# Patient Record
Sex: FEMALE | Race: WHITE | NOT HISPANIC OR LATINO | Employment: FULL TIME | URBAN - NONMETROPOLITAN AREA
[De-identification: names, ages, dates, MRNs, and addresses within clinical notes are randomized per-mention and may not be internally consistent; named-entity substitution may affect disease eponyms.]

---

## 2018-04-16 ENCOUNTER — TRANSCRIBE ORDERS (OUTPATIENT)
Dept: PHYSICAL THERAPY | Facility: CLINIC | Age: 43
End: 2018-04-16

## 2018-04-16 DIAGNOSIS — M22.2X9 DISORDER OF PATELLOFEMORAL JOINT, UNSPECIFIED LATERALITY: Primary | ICD-10-CM

## 2018-04-26 ENCOUNTER — HOSPITAL ENCOUNTER (OUTPATIENT)
Dept: PHYSICAL THERAPY | Facility: HOSPITAL | Age: 43
Setting detail: THERAPIES SERIES
Discharge: HOME | End: 2018-04-26

## 2018-04-26 DIAGNOSIS — M22.2X9 DISORDER OF PATELLOFEMORAL JOINT, UNSPECIFIED LATERALITY: Primary | ICD-10-CM

## 2018-04-26 PROCEDURE — 97161 PT EVAL LOW COMPLEX 20 MIN: CPT | Performed by: PHYSICAL THERAPIST

## 2018-04-26 PROCEDURE — 97110 THERAPEUTIC EXERCISES: CPT | Performed by: PHYSICAL THERAPIST

## 2018-04-26 NOTE — THERAPY EVALUATION
Outpatient Physical Therapy Ortho Initial Evaluation  [facility]     Patient Name: Lyla DOBBS  : 1975  MRN: [id number]  Today's Date: 2018      Visit Date: 2018    There is no problem list on file for this patient.       No past medical history on file.     No past surgical history on file.    Visit Dx:     ICD-10-CM ICD-9-CM   1. Disorder of patellofemoral joint, unspecified laterality M22.2X9 719.96             Patient History     Row Name 18 0900             History    Chief Complaint Pain  -FESTUS      Type of Pain Knee pain  -FESTUS      Date Current Problem(s) Began 18   few months  -FESTUS      Brief Description of Current Complaint Patient notes slow and insidious onset of progressive right knee pain with difficulty with stair ascension>descension.  She notes mild swelling, mostly anterior pain.  Pain is at rest as well as with stairs.  She has had mild releif with dicolfenac sodium.    -FESTUS      Patient/Caregiver Goals Relieve pain  -FESTUS      Current Tobacco Use nonuser  -FESTUS      Occupation/sports/leisure activities .  Hobbies: was trying to exercise, walking, kids:14-18 yo.    -FESTUS      How has patient tried to help current problem? meds  -FESTUS         Pain     Pain Location Knee  -FESTUS      Pain at Present 1  -FESTUS      Pain at Best 1  -FESTUS      Pain at Worst 8;9  -FESTUS      Pain Description Stabbing;Pounding  -FESTUS      What Performance Factors Make the Current Problem(s) WORSE? stairs  -FESTUS      What Performance Factors Make the Current Problem(s) BETTER? rest, meds  -FESTUS      Difficulties at work? none reported  -FESTUS      Difficulties with ADL's? socks/shoes  -FESTUS      Difficulties with recreational activities? exercise, walking  -FESTUS         Fall Risk Assessment    Any falls in the past year: No  -FESTUS         Daily Activities    Primary Language English  -FESTUS      Barriers to learning Visual  -FESTUS      Pt Participated in POC and Goals No  -FESTUS         Safety    Are you being  hurt, hit, or frightened by anyone at home or in your life? Yes  -FESTUS        User Key  (r) = Recorded By, (t) = Taken By, (c) = Cosigned By    Initials Name Provider Type    FESTUS Bell PT Physical Therapist                PT Ortho     Row Name 04/26/18 1000       Posture/Observations    Posture/Observations Comments overweight female with mid genu varus noted.  Minimal to no swelling is noted.  -FESTUS       Special Tests/Palpation    Special Tests/Palpation Knee  -FESTUS       Knee Palpation    Patella Tendon Bilateral:;Tender   R>L  -FESTUS    Pes Anserine Bursa Bilateral:;Tender  -FESTUS    Medial Joint Line --   nontender  -FESTUS    Lateral Joint Line --   nontender  -FESTUS    Posterior Joint Line Bilateral:;Tender  -FESTUS    Medial Gastroc Head Bilateral:;Guarded/taut  -FESTUS    Lateral Gastroc Head Bilateral:;Guarded/taut  -FESTUS    Knee Palpation? Yes  -FESTUS       Knee Special Tests    Anterior drawer (ACL lesion) Negative  -FESTUS    Lachman’s (ACL lesion) Negative  -FESTUS    Posterior drawer (PCL lesion) Right:;Positive   mildly more laxed  -FESTUS    Valgus stress (MCL lesion) Negative  -FESTUS    Varus stress (LCL lesion) Negative  -FESTUS    Juanita’s test (meniscal lesion) Negative  -FESTUS    Patellar grind test (chondromalacia patella) Right:;Positive  -FESTUS       General ROM    RT Lower Ext Rt Knee Extension/Flexion  -FESTUS    LT Lower Ext Lt Knee Extension/Flexion  -FESTUS       Right Lower Ext    Rt Knee Extension/Flexion AROM 3-0-115  -FESTUS       Left Lower Ext    Lt Knee Extension/Flexion AROM 3-0-120  -FESTUS       MMT (Manual Muscle Testing)    Additional Documentation General Assessment (Manual Muscle Testing) (Group)  -FESTUS       General Assessment (Manual Muscle Testing)    General Manual Muscle Testing (MMT) Assessment lower extremity strength deficits identified  -FETSUS       Lower Extremity (Manual Muscle Testing)    Lower Extremity: Manual Muscle Testing (MMT) left hip strength deficit;right hip strength deficit  -FESTUS    Comment, MMT: Lower Extremity pain  with resisted right quad  -FESTUS       Left Hip (Manual Muscle Testing)    Left Hip Manual Muscle Testing (MMT) extension;abduction  -FESTUS    MMT: Extension, Left Hip additional muscles  -FESTUS    MMT: Extension, Left Hip Muscles gluteus valdemar  -FESTUS    MMT, Gluteus Valdemar: Left Hip Extension (3+/5) fair plus  -FESTUS    MMT: ABduction, Left Hip additional muscles  -FESTUS    MMT: ABduction, Left Hip Muscles gluteus medius  -FESTUS    MMT, Gluteus Medius: Left Hip ABduction (3/5) fair  -FESTUS       Right Hip (Manual Muscle Testing)    MMT, Right Hip: Manual Muscle Testing (MMT) symetrical weakness noted at the hips  -FESTUS       Flexibility    Flexibility Tested? Lower Extremity  -FESTUS       Lower Extremity Flexibility    Hamstrings Bilateral:;Moderately limited  -FESTUS    Quadriceps Bilateral:;Moderately limited  -FESTUS    Hip External Rotators Bilateral:;Moderately limited  -FESTUS    Hip Internal Rotators Bilateral:;Mildly limited;Moderately limited  -FESTUS       Gait/Stairs Assessment/Training    Comment (Gait/Stairs) intermitted RLE antalgia is noted with fatigue.  Minimal antalgia with stairs is noted without hand rail but she notes pain with up>down.  -FESTUS      User Key  (r) = Recorded By, (t) = Taken By, (c) = Cosigned By    Initials Name Provider Type    FESTUS Bell, PT Physical Therapist                      Therapy Education  Education Details: initiated HEP per exercise flowsheet  Given: HEP  Program: New  How Provided: Verbal, Demonstration, Written  Provided to: Patient  Level of Understanding: Verbalized, Demonstrated           PT OP Goals     Row Name 04/26/18 1000          PT Short Term Goals    STG Date to Achieve 05/10/18  -FESTUS     STG 1 Patient to be compliant with initial HEP  -FESTUS     STG 1 Progress New  -FESTUS     STG 2 Patient to report pain intermittently 0/10  -FESTUS     STG 2 Progress New  -FESTUS        Long Term Goals    LTG Date to Achieve 05/24/18  -FESTUS     LTG 1 Patient to be independent with final HEP  -FESTUS     LTG 1 Progress New   -FESTUS     LTG 2 Patient to demonstrate squatting to at least 75 knee flexion with minimal to no pain  -FESTUS     LTG 2 Progress New  -FESTUS     LTG 3 Patient to demonstrate minimal palpable tenderness  -FESTUS     LTG 3 Progress New  -FESTUS     LTG 4 Patient to improve LEFS score to at least 53/80  -FESTUS     LTG 4 Progress New  -FESTUS        Time Calculation    PT Goal Re-Cert Due Date 07/25/18  -FESTUS       User Key  (r) = Recorded By, (t) = Taken By, (c) = Cosigned By    Initials Name Provider Type    FESTUS Abhay Bell, PT Physical Therapist                PT Assessment/Plan     Row Name 04/26/18 1000          PT Assessment    Functional Limitations Impaired gait;Impaired locomotion;Performance in self-care ADL;Performance in leisure activities  -FESTUS     Impairments Gait;Endurance;Poor body mechanics;Pain;Range of motion  -FESTUS     Assessment Comments Patient presents with anterior knee pain at least 2-3 months in duration with painful stairs ascension and pain with bending the knee.  She demonstrate mild to moderate hip weakness with HS and calf tightness and pain with resisted quads.    -FESTUS     Please refer to paper survey for additional self-reported information Yes  -FESTUS     Rehab Potential Good  -FESTUS     Patient/caregiver participated in establishment of treatment plan and goals Yes  -FESTUS     Patient would benefit from skilled therapy intervention Yes  -FESTUS        PT Plan    PT Frequency 2x/week;1x/week  -FESTUS     Predicted Duration of Therapy Intervention (OT Eval) 6-8 visits  -FESTUS     Planned CPT's? PT EVAL LOW COMPLEXITY: 34624;PT THER PROC EA 15 MIN: 21460;PT MANUAL THERAPY EA 15 MIN: 73547;PT NEUROMUSC RE-EDUCATION EA 15 MIN: 32673;PT ELECTRICAL STIM UNATTEND: ;PT ULTRASOUND EA 15 MIN: 35621;PT HOT/COLD PACK WC NONMCARE: 31037  -FESTUS     PT Plan Comments PFPS appropriate strengthening and stretching, squat training, modalities and manual techniques as indicated.  -FESTUS       User Key  (r) = Recorded By, (t) = Taken By, (c) = Cosigned  By    Initials Name Provider Type    FESTUS Bell, PT Physical Therapist                  Exercises     Row Name 04/26/18 1000             Exercise 1    Exercise Name 1 Total Ther ex time  -FESTUS      Time 1 10 minutes  -FESTUS         Exercise 2    Exercise Name 2 S/L hip abd  -FESTUS      Reps 2 10 each  -FESTUS         Exercise 3    Exercise Name 3 prone quad stretch  -FSETUS      Sets 3 2  -FESTUS      Time 3 30 seconds  -FESTUS         Exercise 4    Exercise Name 4 seated HS stretch  -FESTUS      Reps 4 2  -FESTUS      Time 4 30 seconds  -FESTUS        User Key  (r) = Recorded By, (t) = Taken By, (c) = Cosigned By    Initials Name Provider Type    FESTUS Bell, PT Physical Therapist                                  Time Calculation:   Start Time: 0943  Total Timed Code Minutes- PT: 10 minute(s)     Therapy Charges for Today     Code Description Service Date Service Provider Modifiers Qty    29620544284 HC PT THER PROC EA 15 MIN 4/26/2018 Abhay Bell, PT GP 1    70104214578 HC PT EVAL LOW COMPLEXITY 3 4/26/2018 Abhay Bell, PT GP 1                    Abhay Bell, PT  4/26/2018

## 2018-04-30 ENCOUNTER — HOSPITAL ENCOUNTER (OUTPATIENT)
Dept: PHYSICAL THERAPY | Facility: HOSPITAL | Age: 43
Setting detail: THERAPIES SERIES
Discharge: HOME | End: 2018-04-30

## 2018-04-30 DIAGNOSIS — M22.2X9 DISORDER OF PATELLOFEMORAL JOINT, UNSPECIFIED LATERALITY: Primary | ICD-10-CM

## 2018-04-30 PROCEDURE — 97110 THERAPEUTIC EXERCISES: CPT | Performed by: PHYSICAL THERAPIST

## 2018-05-03 ENCOUNTER — HOSPITAL ENCOUNTER (OUTPATIENT)
Dept: PHYSICAL THERAPY | Facility: HOSPITAL | Age: 43
Setting detail: THERAPIES SERIES
Discharge: HOME | End: 2018-05-03

## 2018-05-03 DIAGNOSIS — M22.2X9 DISORDER OF PATELLOFEMORAL JOINT, UNSPECIFIED LATERALITY: Primary | ICD-10-CM

## 2018-05-03 PROCEDURE — 97110 THERAPEUTIC EXERCISES: CPT | Performed by: PHYSICAL THERAPIST

## 2018-05-03 NOTE — THERAPY TREATMENT NOTE
Outpatient Physical Therapy Ortho Treatment Note  [facility]     Patient Name: Lyla DOBBS  : 1975  MRN: [id number]  Today's Date: 5/3/2018      Visit Date: 2018    Visit Dx:    ICD-10-CM ICD-9-CM   1. Disorder of patellofemoral joint, unspecified laterality M22.2X9 719.96       There is no problem list on file for this patient.       No past medical history on file.     No past surgical history on file.                          PT Assessment/Plan     Row Name 18 0800          PT Assessment    Assessment Comments Patient demonstrates improved ROM and tolerance to activities but it is difficult to assess the benefit of taping d/t duration after last session.  -FESTUS        PT Plan    PT Plan Comments add sidestepping/zig-zags and potential OKC LAQ 90-45  -FESTUS       User Key  (r) = Recorded By, (t) = Taken By, (c) = Cosigned By    Initials Name Provider Type    FESTUS Bell, PT Physical Therapist                Modalities     Row Name 18 07             Other Treatment Provided    Taping / Bracing right McConnel patellar taping with medial tibial torsion  -FESTUS        User Key  (r) = Recorded By, (t) = Taken By, (c) = Cosigned By    Initials Name Provider Type    FESTUS Bell, PT Physical Therapist                Exercises     Row Name 18 07             Subjective Comments    Subjective Comments Patient states that the tape was coming off by the next morning and she is unsure whether it helped or not.  Her pain has remained minimal, but she has continued to take her medicine  -FESTUS         Subjective Pain    Able to rate subjective pain? yes  -FESTUS      Pre-Treatment Pain Level 1  -FESTUS         Exercise 1    Exercise Name 1 Total Ther ex time  -FESTUS      Time 1 30  -FESTUS         Exercise 2    Exercise Name 2 NuStep L5  -FESTUS      Time 2 4 minutes  -FESTUS         Exercise 3    Exercise Name 3 prone quad stretch  -FESTUS      Sets 3 2  -FESTUS      Time 3 30 seconds  -FESTUS         Exercise 4     Exercise Name 4 seated HS stretch  -FESTUS      Reps 4 2  -FESTUS      Time 4 30 seconds  -FESTUS         Exercise 5    Exercise Name 5 incline stretch  -FESTUS      Sets 5 2  -FESTUS      Time 5 1 minutes  -FESTUS         Exercise 7    Exercise Name 7 squat training  -FESTUS      Time 7 10 minutes  -FESTUS        User Key  (r) = Recorded By, (t) = Taken By, (c) = Cosigned By    Initials Name Provider Type    FESTUS Abhay Bell, PT Physical Therapist                               Time Calculation:   Start Time: 0730  Total Timed Code Minutes- PT: 31 minute(s)    Therapy Charges for Today     Code Description Service Date Service Provider Modifiers Qty    07203038575  PT THER PROC EA 15 MIN 5/3/2018 Abhay Bell, PT GP 2                    Abhay Bell, PT  5/3/2018

## 2018-05-07 ENCOUNTER — APPOINTMENT (OUTPATIENT)
Dept: PHYSICAL THERAPY | Facility: HOSPITAL | Age: 43
End: 2018-05-07

## 2018-05-10 ENCOUNTER — APPOINTMENT (OUTPATIENT)
Dept: PHYSICAL THERAPY | Facility: HOSPITAL | Age: 43
End: 2018-05-10

## 2018-05-14 ENCOUNTER — APPOINTMENT (OUTPATIENT)
Dept: PHYSICAL THERAPY | Facility: HOSPITAL | Age: 43
End: 2018-05-14

## 2018-05-17 ENCOUNTER — APPOINTMENT (OUTPATIENT)
Dept: PHYSICAL THERAPY | Facility: HOSPITAL | Age: 43
End: 2018-05-17

## 2018-05-21 ENCOUNTER — APPOINTMENT (OUTPATIENT)
Dept: PHYSICAL THERAPY | Facility: HOSPITAL | Age: 43
End: 2018-05-21

## 2018-05-29 ENCOUNTER — APPOINTMENT (OUTPATIENT)
Dept: PHYSICAL THERAPY | Facility: HOSPITAL | Age: 43
End: 2018-05-29

## 2018-05-31 ENCOUNTER — APPOINTMENT (OUTPATIENT)
Dept: PHYSICAL THERAPY | Facility: HOSPITAL | Age: 43
End: 2018-05-31

## 2018-05-31 ENCOUNTER — DOCUMENTATION (OUTPATIENT)
Dept: PHYSICAL THERAPY | Facility: HOSPITAL | Age: 43
End: 2018-05-31

## 2018-05-31 DIAGNOSIS — M22.2X9 DISORDER OF PATELLOFEMORAL JOINT, UNSPECIFIED LATERALITY: Primary | ICD-10-CM

## 2018-05-31 NOTE — THERAPY DISCHARGE NOTE
Outpatient Physical Therapy Discharge Summary         Patient Name: Lyla DOBBS  : 1975  MRN: [id number]    Today's Date: 2018    Visit Dx:    ICD-10-CM ICD-9-CM   1. Disorder of patellofemoral joint, unspecified laterality M22.2X9 719.96             PT OP Goals     Row Name 18 1300          PT Short Term Goals    STG Date to Achieve 05/10/18  -FESTUS     STG 1 Patient to be compliant with initial HEP  -FESTUS     STG 1 Progress Met  -FESTUS     STG 2 Patient to report pain intermittently 0/10  -FESTUS     STG 2 Progress Met  -FESTUS        Long Term Goals    LTG Date to Achieve 18  -FESTUS     LTG 1 Patient to be independent with final HEP  -FESTUS     LTG 1 Progress Partially Met  -FESTUS     LTG 2 Patient to demonstrate squatting to at least 75 knee flexion with minimal to no pain  -FESTUS     LTG 2 Progress Not Met  -FESTUS     LTG 3 Patient to demonstrate minimal palpable tenderness  -FESTUS     LTG 3 Progress Not Met  -FESTUS     LTG 4 Patient to improve LEFS score to at least 53/80  -FESTUS     LTG 4 Progress Not Met  -FESTUS       User Key  (r) = Recorded By, (t) = Taken By, (c) = Cosigned By    Initials Name Provider Type    FESTUS Bell, PT Physical Therapist          OP PT Discharge Summary  Date of Discharge: 18  Reason for Discharge: Patient/Caregiver request  Outcomes Achieved: Patient able to partially acheive established goals, Refer to plan of care for updates on goals achieved  Discharge Destination: Home with home program  Discharge Instructions/Additional Comments: Patient requested to hold PT due to personal circumstances      3/4 visits attended      Abhay Bell, PT  2018

## 2021-01-12 ENCOUNTER — OFFICE VISIT (OUTPATIENT)
Dept: ORTHOPEDIC SURGERY | Facility: CLINIC | Age: 46
End: 2021-01-12

## 2021-01-12 VITALS — HEIGHT: 67 IN | WEIGHT: 231 LBS | RESPIRATION RATE: 18 BRPM | BODY MASS INDEX: 36.26 KG/M2

## 2021-01-12 DIAGNOSIS — M77.11 LATERAL EPICONDYLITIS OF RIGHT ELBOW: ICD-10-CM

## 2021-01-12 DIAGNOSIS — G56.03 BILATERAL CARPAL TUNNEL SYNDROME: ICD-10-CM

## 2021-01-12 DIAGNOSIS — M25.521 ARTHRALGIA OF RIGHT ELBOW: Primary | ICD-10-CM

## 2021-01-12 PROCEDURE — 99203 OFFICE O/P NEW LOW 30 MIN: CPT | Performed by: ORTHOPAEDIC SURGERY

## 2021-01-12 PROCEDURE — 20550 NJX 1 TENDON SHEATH/LIGAMENT: CPT | Performed by: ORTHOPAEDIC SURGERY

## 2021-01-12 RX ORDER — LORATADINE 10 MG/1
TABLET ORAL DAILY
COMMUNITY
Start: 2020-12-05 | End: 2021-07-13

## 2021-01-12 RX ORDER — LIDOCAINE HYDROCHLORIDE 10 MG/ML
5 INJECTION, SOLUTION INFILTRATION; PERINEURAL
Status: COMPLETED | OUTPATIENT
Start: 2021-01-12 | End: 2021-01-12

## 2021-01-12 RX ORDER — LEVOTHYROXINE, LIOTHYRONINE 57; 13.5 UG/1; UG/1
90 TABLET ORAL DAILY
COMMUNITY
Start: 2021-01-10

## 2021-01-12 RX ORDER — FOLIC ACID 1 MG/1
1000 TABLET ORAL DAILY
COMMUNITY
Start: 2020-11-06 | End: 2021-07-13

## 2021-01-12 RX ADMIN — LIDOCAINE HYDROCHLORIDE 5 MG: 10 INJECTION, SOLUTION INFILTRATION; PERINEURAL at 15:21

## 2021-01-12 NOTE — PROGRESS NOTES
Subjective   Patient ID: Lyla Castaneda is a 45 y.o. female  Pain of the Right Elbow (Patient states 2 months ago she woke up with pain in her elbow.)             History of Present Illness  Right-hand-dominant 45-year-old has had many years of numbness and tingling in both hands right worse than left she used to do soldering work for many years, now works in HR doing mostly computer work.  Complains of loss of feeling in a long and ring fingers of both hands has not improved with splinting has been treated for tennis elbow in the past by her PCP with bracing medications neither of which have really helped.  She denies trauma to the elbow loss of motion the elbow, elbow pain occurs laterally radiates down to the proximal radial forearm region at times.  Denies neck pain shoulder paresthesias or history of injection to the elbow.      Review of Systems   Constitutional: Negative for fever.   HENT: Negative for dental problem and voice change.    Eyes: Negative for visual disturbance.   Respiratory: Negative for shortness of breath.    Cardiovascular: Negative for chest pain.   Gastrointestinal: Negative for abdominal pain.   Genitourinary: Negative for dysuria.   Musculoskeletal: Positive for arthralgias. Negative for gait problem and joint swelling.   Skin: Negative for rash.   Neurological: Negative for speech difficulty.   Hematological: Does not bruise/bleed easily.   Psychiatric/Behavioral: Negative for confusion.       History reviewed. No pertinent past medical history.     History reviewed. No pertinent surgical history.    History reviewed. No pertinent family history.    Social History     Socioeconomic History   • Marital status:      Spouse name: Not on file   • Number of children: Not on file   • Years of education: Not on file   • Highest education level: Not on file   Tobacco Use   • Smoking status: Never Smoker   • Smokeless tobacco: Never Used   Substance and Sexual Activity   • Alcohol use:  "Never     Frequency: Never   • Drug use: Never   • Sexual activity: Defer       I have reviewed the medical and surgical history, family history, social history, medications, and/or allergies, and the review of systems of this report.    Allergies   Allergen Reactions   • Hydrocodone Nausea Only     Itching     • Morphine Itching         Current Outpatient Medications:   •  vitamin D3 125 MCG (5000 UT) capsule capsule, Take 5,000 Units by mouth Daily., Disp: , Rfl:   •  folic acid (FOLVITE) 1 MG tablet, Take 1,000 mcg by mouth Daily., Disp: , Rfl:   •  loratadine (CLARITIN) 10 MG tablet, Take  by mouth Daily., Disp: , Rfl:   •  NP Thyroid 90 MG tablet, Take 90 mg by mouth Daily., Disp: , Rfl:     Objective   Resp 18   Ht 170.2 cm (67\")   Wt 105 kg (231 lb)   BMI 36.18 kg/m²    Physical Exam  Constitutional: Patient is oriented to person, place, and time. Patient appears well-developed and well-nourished.   HENT:Head: Normocephalic and atraumatic.   Eyes: EOM are normal. Pupils are equal, round, and reactive to light.   Neck: Normal range of motion. Neck supple.   Cardiovascular: Normal rate.    Pulmonary/Chest: Effort normal and breath sounds normal.   Abdominal: Soft.   Neurological: Patient is alert and oriented to person, place, and time.   Skin: Skin is warm and dry.   Psychiatric: Patient has a normal mood and affect.   Nursing note and vitals reviewed.       [unfilled]   Right elbow: Point tenderness over the lateral epicondylar area minimal pain over the radiocapitellar region full supination pronation no elbow instability no tenderness olecranon or medial side of the elbow.  No proximal forearm atrophy or skin lesions noted.    Right hand: Positive Tinel's Phalen's and carpal compression test with reproduction of paresthesias in the long and ring finger, decreased sensation in those fingertips, good circulation full range of motion no palpable triggering of the fingers.  She does have a small palpable " "ganglion cyst on the dorsum of the right proximal radiocarpal area with minimal tenderness no signs of carpal instability.  No thenar atrophy    Left hand: Positive Tinel's Phalen's and carpal compression test with reproduction of paresthesias in the long and ring finger, no triggering of the fingers full painless wrist range of motion with no palpable ganglion cysts.  No thenar atrophy    Assessment/Plan   Review of Radiographic Studies:    Radiographic images today of affected area I personally viewed and showed no sign of acute fracture or dislocation.      Rt tennis elbow injection    Date/Time: 1/12/2021 3:21 PM  Performed by: Con Jose MD  Authorized by: Con Jose MD   Risks and benefits: risks, benefits and alternatives were discussed  Consent given by: patient  Patient understanding: patient states understanding of the procedure being performed  Patient consent: the patient's understanding of the procedure matches consent given  Procedure consent: procedure consent matches procedure scheduled  Relevant documents: relevant documents present and verified  Test results: test results available and properly labeled  Site marked: the operative site was marked  Imaging studies: imaging studies available  Patient identity confirmed: verbally with patient  Time out: Immediately prior to procedure a \"time out\" was called to verify the correct patient, procedure, equipment, support staff and site/side marked as required.  Preparation: Patient was prepped and draped in the usual sterile fashion.  Patient tolerance: patient tolerated the procedure well with no immediate complications  Comments: 0.5cc triamcinolone 40mg per 1mL [id number] lot#[id number] exp-10/01/2021  Medications administered: 5 mg lidocaine 1 %           Diagnoses and all orders for this visit:    1. Arthralgia of right elbow (Primary)  -     XR Elbow 3+ View Right    2. Lateral epicondylitis of right elbow    3. Bilateral carpal tunnel " syndrome       Orthopedic activities reviewed and patient expressed appreciation, Risk, benefits, and merits of treatment alternatives reviewed with the patient and questions answered, Using illustrations and models, the nature of the pathology was explained to the patient and Physical therapy referral given      Recommendations/Plan:   Work/Activity Status: May perform usual activities as tolerated    Patient agreeable to call or return sooner for any concerns.             Impression:  Bilateral hand carpal tunnel syndrome, right elbow lateral condyle-itis  Plan:  Refer to hand therapy for the elbow, EMG study both hands, discuss surgical treatment of both hands next visit

## 2021-01-20 ENCOUNTER — PROCEDURE VISIT (OUTPATIENT)
Dept: ORTHOPEDIC SURGERY | Facility: CLINIC | Age: 46
End: 2021-01-20

## 2021-01-20 DIAGNOSIS — R20.2 PARESTHESIA: ICD-10-CM

## 2021-01-20 DIAGNOSIS — G56.03 CARPAL TUNNEL SYNDROME, BILATERAL: ICD-10-CM

## 2021-01-20 PROCEDURE — 95886 MUSC TEST DONE W/N TEST COMP: CPT | Performed by: PHYSICAL THERAPIST

## 2021-01-20 PROCEDURE — 95911 NRV CNDJ TEST 9-10 STUDIES: CPT | Performed by: PHYSICAL THERAPIST

## 2021-02-09 ENCOUNTER — OFFICE VISIT (OUTPATIENT)
Dept: ORTHOPEDIC SURGERY | Facility: CLINIC | Age: 46
End: 2021-02-09

## 2021-02-09 VITALS — BODY MASS INDEX: 35.41 KG/M2 | WEIGHT: 225.6 LBS | HEIGHT: 67 IN | TEMPERATURE: 97.2 F

## 2021-02-09 DIAGNOSIS — G56.03 CARPAL TUNNEL SYNDROME, BILATERAL: Primary | ICD-10-CM

## 2021-02-09 DIAGNOSIS — G54.2 CERVICAL NEUROPATHY: ICD-10-CM

## 2021-02-09 PROCEDURE — 99213 OFFICE O/P EST LOW 20 MIN: CPT | Performed by: ORTHOPAEDIC SURGERY

## 2021-02-09 RX ORDER — EPINASTINE HCL 0.05 %
1 DROPS OPHTHALMIC (EYE) 2 TIMES DAILY
COMMUNITY
Start: 2021-01-18 | End: 2022-01-06

## 2021-02-09 NOTE — PROGRESS NOTES
Subjective   Patient ID: Lyla Castaneda is a 45 y.o. female  Results of the Left Wrist and Results of the Right Wrist (Go over EMG results)             History of Present Illness  Continued bilateral hand paresthesias recent EMG study entirely normal, her therapist thinks she may have an underlying cervical condition as she has increasing neck pain stiffness chronic headaches and bilateral posterior shoulder pain as well.  Night splinting has not helped either hand she said no progressive weakness injury or loss of motion either wrist.      Review of Systems   Constitutional: Negative for fever.   HENT: Negative for dental problem and voice change.    Eyes: Negative for visual disturbance.   Respiratory: Negative for shortness of breath.    Cardiovascular: Negative for chest pain.   Gastrointestinal: Negative for abdominal pain.   Genitourinary: Negative for dysuria.   Musculoskeletal: Positive for arthralgias. Negative for gait problem and joint swelling.   Skin: Negative for rash.   Neurological: Negative for speech difficulty.   Hematological: Does not bruise/bleed easily.   Psychiatric/Behavioral: Negative for confusion.       No past medical history on file.     No past surgical history on file.    No family history on file.    Social History     Socioeconomic History   • Marital status:      Spouse name: Not on file   • Number of children: Not on file   • Years of education: Not on file   • Highest education level: Not on file   Tobacco Use   • Smoking status: Never Smoker   • Smokeless tobacco: Never Used   Substance and Sexual Activity   • Alcohol use: Never     Frequency: Never   • Drug use: Never   • Sexual activity: Defer       I have reviewed the medical and surgical history, family history, social history, medications, and/or allergies, and the review of systems of this report.    Allergies   Allergen Reactions   • Hydrocodone Nausea Only     Itching     • Morphine Itching         Current  "Outpatient Medications:   •  Epinastine HCl 0.05 % ophthalmic solution, Administer 1 drop to both eyes 2 (Two) Times a Day., Disp: , Rfl:   •  folic acid (FOLVITE) 1 MG tablet, Take 1,000 mcg by mouth Daily., Disp: , Rfl:   •  loratadine (CLARITIN) 10 MG tablet, Take  by mouth Daily., Disp: , Rfl:   •  NP Thyroid 90 MG tablet, Take 90 mg by mouth Daily., Disp: , Rfl:   •  vitamin D3 125 MCG (5000 UT) capsule capsule, Take 5,000 Units by mouth Daily., Disp: , Rfl:     Objective   Temp 97.2 °F (36.2 °C)   Ht 170.2 cm (67\")   Wt 102 kg (225 lb 9.6 oz)   BMI 35.33 kg/m²    Physical Exam  Constitutional: Patient is oriented to person, place, and time. Patient appears well-developed and well-nourished.   HENT:Head: Normocephalic and atraumatic.   Eyes: EOM are normal. Pupils are equal, round, and reactive to light.   Neck: Normal range of motion. Neck supple.   Cardiovascular: Normal rate.    Pulmonary/Chest: Effort normal and breath sounds normal.   Abdominal: Soft.   Neurological: Patient is alert and oriented to person, place, and time.   Skin: Skin is warm and dry.   Psychiatric: Patient has a normal mood and affect.   Nursing note and vitals reviewed.       [unfilled]   Cervical spine: Limited range of motion with paracervical spasm with extension rotation, negative Spurling finding for posterior shoulder pain.    Right and left hands demonstrate decreased sensation long fingertips but no thenar atrophy full painless wrist range of motion and normal circulation    Assessment/Plan   Review of Radiographic Studies:    No new imaging done today.      Procedures     Diagnoses and all orders for this visit:    1. Carpal tunnel syndrome, bilateral (Primary)    2. Cervical neuropathy  -     Ambulatory Referral to Neurology       Orthopedic activities reviewed and patient expressed appreciation and Using illustrations and models, the nature of the pathology was explained to the patient      Recommendations/Plan: "   Work/Activity Status: May perform usual activities as tolerated    Patient agreeable to call or return sooner for any concerns.             Impression:  Bilateral hand paresthesias with normal EMG studies rule out cervical stenosis and/or cervical radiculopathy  Plan:  Neurology referral for further evaluation work-up treatment and she will follow-up with orthopedics as needed

## 2021-03-23 ENCOUNTER — BULK ORDERING (OUTPATIENT)
Dept: CASE MANAGEMENT | Facility: OTHER | Age: 46
End: 2021-03-23

## 2021-03-23 DIAGNOSIS — Z23 IMMUNIZATION DUE: ICD-10-CM

## 2021-05-03 ENCOUNTER — TELEPHONE (OUTPATIENT)
Dept: ORTHOPEDIC SURGERY | Facility: CLINIC | Age: 46
End: 2021-05-03

## 2021-05-03 NOTE — TELEPHONE ENCOUNTER
Provider: DR. POLLACK  Caller: [employer]  Relationship to Patient: SELF     Phone Number: [phone]  Reason for Call: RT. ELBOW PAIN  When was the patient last seen: 2-9-21    PATIENT WOULD LIKE A CALL BACK TO DISCUSS HER UPCOMING NEUROLOGIST APPT ON 5-12-21.  PATIENT STATED THAT HER ARM HAS A BURNING SENSATION AND FEELS TIRED ALL OF THE TIME. SHE WOULD LIKE TO DISCUSS IF THE NEUROLOGY APPT IS NEEDED OR IF SHE SHOULD MAKE A F/U WITH DR. POLLACK

## 2021-05-12 ENCOUNTER — OFFICE VISIT (OUTPATIENT)
Dept: NEUROLOGY | Age: 46
End: 2021-05-12

## 2021-05-12 ENCOUNTER — LAB (OUTPATIENT)
Dept: LAB | Facility: HOSPITAL | Age: 46
End: 2021-05-12

## 2021-05-12 VITALS
DIASTOLIC BLOOD PRESSURE: 80 MMHG | OXYGEN SATURATION: 97 % | SYSTOLIC BLOOD PRESSURE: 120 MMHG | TEMPERATURE: 98.9 F | HEART RATE: 95 BPM | HEIGHT: 67 IN | BODY MASS INDEX: 35.44 KG/M2 | WEIGHT: 225.8 LBS

## 2021-05-12 DIAGNOSIS — M70.31 BURSITIS OF RIGHT ELBOW, UNSPECIFIED BURSA: ICD-10-CM

## 2021-05-12 DIAGNOSIS — M54.12 CERVICAL RADICULOPATHY: Primary | ICD-10-CM

## 2021-05-12 LAB
ALBUMIN SERPL-MCNC: 4.6 G/DL (ref 3.5–5.2)
ALBUMIN/GLOB SERPL: 1.8 G/DL
ALP SERPL-CCNC: 115 U/L (ref 39–117)
ALT SERPL W P-5'-P-CCNC: 31 U/L (ref 1–33)
ANION GAP SERPL CALCULATED.3IONS-SCNC: 8.3 MMOL/L (ref 5–15)
AST SERPL-CCNC: 18 U/L (ref 1–32)
BASOPHILS # BLD AUTO: 0.05 10*3/MM3 (ref 0–0.2)
BASOPHILS NFR BLD AUTO: 0.6 % (ref 0–1.5)
BILIRUB SERPL-MCNC: 0.3 MG/DL (ref 0–1.2)
BUN SERPL-MCNC: 14 MG/DL (ref 6–20)
BUN/CREAT SERPL: 23.3 (ref 7–25)
CALCIUM SPEC-SCNC: 9.8 MG/DL (ref 8.6–10.5)
CHLORIDE SERPL-SCNC: 104 MMOL/L (ref 98–107)
CK SERPL-CCNC: 41 U/L (ref 20–180)
CO2 SERPL-SCNC: 27.7 MMOL/L (ref 22–29)
CREAT SERPL-MCNC: 0.6 MG/DL (ref 0.57–1)
CRP SERPL-MCNC: <0.3 MG/DL (ref 0–0.5)
DEPRECATED RDW RBC AUTO: 46.9 FL (ref 37–54)
EOSINOPHIL # BLD AUTO: 0.1 10*3/MM3 (ref 0–0.4)
EOSINOPHIL NFR BLD AUTO: 1.1 % (ref 0.3–6.2)
ERYTHROCYTE [DISTWIDTH] IN BLOOD BY AUTOMATED COUNT: 12.9 % (ref 12.3–15.4)
GFR SERPL CREATININE-BSD FRML MDRD: 108 ML/MIN/1.73
GLOBULIN UR ELPH-MCNC: 2.6 GM/DL
GLUCOSE SERPL-MCNC: 93 MG/DL (ref 65–99)
HCT VFR BLD AUTO: 43.7 % (ref 34–46.6)
HGB BLD-MCNC: 14.6 G/DL (ref 12–15.9)
IMM GRANULOCYTES # BLD AUTO: 0.05 10*3/MM3 (ref 0–0.05)
IMM GRANULOCYTES NFR BLD AUTO: 0.6 % (ref 0–0.5)
LYMPHOCYTES # BLD AUTO: 3 10*3/MM3 (ref 0.7–3.1)
LYMPHOCYTES NFR BLD AUTO: 33.1 % (ref 19.6–45.3)
MCH RBC QN AUTO: 32.8 PG (ref 26.6–33)
MCHC RBC AUTO-ENTMCNC: 33.4 G/DL (ref 31.5–35.7)
MCV RBC AUTO: 98.2 FL (ref 79–97)
MONOCYTES # BLD AUTO: 0.55 10*3/MM3 (ref 0.1–0.9)
MONOCYTES NFR BLD AUTO: 6.1 % (ref 5–12)
NEUTROPHILS NFR BLD AUTO: 5.31 10*3/MM3 (ref 1.7–7)
NEUTROPHILS NFR BLD AUTO: 58.5 % (ref 42.7–76)
NRBC BLD AUTO-RTO: 0 /100 WBC (ref 0–0.2)
PLATELET # BLD AUTO: 251 10*3/MM3 (ref 140–450)
PMV BLD AUTO: 10.9 FL (ref 6–12)
POTASSIUM SERPL-SCNC: 4 MMOL/L (ref 3.5–5.2)
PROT SERPL-MCNC: 7.2 G/DL (ref 6–8.5)
RBC # BLD AUTO: 4.45 10*6/MM3 (ref 3.77–5.28)
SODIUM SERPL-SCNC: 140 MMOL/L (ref 136–145)
TSH SERPL DL<=0.05 MIU/L-ACNC: 0.66 UIU/ML (ref 0.27–4.2)
WBC # BLD AUTO: 9.06 10*3/MM3 (ref 3.4–10.8)

## 2021-05-12 PROCEDURE — 85025 COMPLETE CBC W/AUTO DIFF WBC: CPT | Performed by: PSYCHIATRY & NEUROLOGY

## 2021-05-12 PROCEDURE — 86140 C-REACTIVE PROTEIN: CPT | Performed by: PSYCHIATRY & NEUROLOGY

## 2021-05-12 PROCEDURE — 36415 COLL VENOUS BLD VENIPUNCTURE: CPT | Performed by: PSYCHIATRY & NEUROLOGY

## 2021-05-12 PROCEDURE — 80053 COMPREHEN METABOLIC PANEL: CPT | Performed by: PSYCHIATRY & NEUROLOGY

## 2021-05-12 PROCEDURE — 82550 ASSAY OF CK (CPK): CPT | Performed by: PSYCHIATRY & NEUROLOGY

## 2021-05-12 PROCEDURE — 84443 ASSAY THYROID STIM HORMONE: CPT | Performed by: PSYCHIATRY & NEUROLOGY

## 2021-05-12 PROCEDURE — 99244 OFF/OP CNSLTJ NEW/EST MOD 40: CPT | Performed by: PSYCHIATRY & NEUROLOGY

## 2021-05-12 RX ORDER — FLUTICASONE PROPIONATE 50 MCG
2 SPRAY, SUSPENSION (ML) NASAL DAILY
COMMUNITY
End: 2022-07-29

## 2021-05-12 RX ORDER — TIZANIDINE 2 MG/1
2 TABLET ORAL NIGHTLY PRN
Qty: 30 TABLET | Refills: 2 | Status: SHIPPED | OUTPATIENT
Start: 2021-05-12 | End: 2021-07-13 | Stop reason: DRUGHIGH

## 2021-05-12 RX ORDER — PREDNISONE 10 MG/1
TABLET ORAL
Qty: 20 TABLET | Refills: 1 | Status: SHIPPED | OUTPATIENT
Start: 2021-05-12 | End: 2021-07-13

## 2021-05-12 RX ORDER — MELOXICAM 7.5 MG/1
7.5 TABLET ORAL DAILY
Qty: 30 TABLET | Refills: 2 | Status: SHIPPED | OUTPATIENT
Start: 2021-05-12 | End: 2021-10-19

## 2021-05-12 RX ORDER — CETIRIZINE HYDROCHLORIDE 10 MG/1
TABLET ORAL
COMMUNITY
Start: 2021-05-03 | End: 2022-07-29

## 2021-06-09 ENCOUNTER — HOSPITAL ENCOUNTER (OUTPATIENT)
Dept: MRI IMAGING | Facility: HOSPITAL | Age: 46
Discharge: HOME | End: 2021-06-09
Admitting: PSYCHIATRY & NEUROLOGY

## 2021-06-09 PROCEDURE — 72141 MRI NECK SPINE W/O DYE: CPT

## 2021-06-15 ENCOUNTER — TELEPHONE (OUTPATIENT)
Dept: ORTHOPEDIC SURGERY | Facility: CLINIC | Age: 46
End: 2021-06-15

## 2021-06-15 NOTE — TELEPHONE ENCOUNTER
Provider: DR. POLLACK    Caller: PATIENT     Relationship to Patient: SELF       Phone Number:  [phone]     Reason for Call: PT. STATES THAT DR. POLLACK HAD REFERRED HER TO A NEUROLOGIST WHO DID A RECENT MRI OF CERVICAL SPINE.   PT. STATES MRI ONLY SHOWED ARTHRITIS IN HER NECK. SEE MRI FROM 06/09/21 IN CHART.  SHE DOESN'T THINK IT HAS ANYTHING TO DO WITH HER RIGHT ELBOW PAIN AND SWELLING.   PT. WOULD LIKE TO KNOW WHAT DR. POLLACK ADVISES FOR HER TO DO NEXT.   PLEASE CALL TO ADVISE.

## 2021-07-13 ENCOUNTER — OFFICE VISIT (OUTPATIENT)
Dept: NEUROLOGY | Facility: CLINIC | Age: 46
End: 2021-07-13

## 2021-07-13 VITALS
BODY MASS INDEX: 35.3 KG/M2 | HEART RATE: 88 BPM | SYSTOLIC BLOOD PRESSURE: 110 MMHG | OXYGEN SATURATION: 98 % | WEIGHT: 225.4 LBS | TEMPERATURE: 96.9 F | DIASTOLIC BLOOD PRESSURE: 78 MMHG

## 2021-07-13 DIAGNOSIS — M54.12 CERVICAL RADICULOPATHY: Primary | ICD-10-CM

## 2021-07-13 DIAGNOSIS — M70.31 BURSITIS OF RIGHT ELBOW, UNSPECIFIED BURSA: ICD-10-CM

## 2021-07-13 PROCEDURE — 99214 OFFICE O/P EST MOD 30 MIN: CPT | Performed by: NURSE PRACTITIONER

## 2021-07-13 RX ORDER — PROGESTERONE 100 MG/1
100 CAPSULE ORAL
COMMUNITY
Start: 2021-07-09 | End: 2022-02-08

## 2021-07-13 RX ORDER — TIZANIDINE HYDROCHLORIDE 4 MG/1
4 CAPSULE, GELATIN COATED ORAL 3 TIMES DAILY PRN
Qty: 90 CAPSULE | Refills: 1 | Status: SHIPPED | OUTPATIENT
Start: 2021-07-13 | End: 2021-10-04

## 2021-07-13 RX ORDER — DULOXETIN HYDROCHLORIDE 30 MG/1
30 CAPSULE, DELAYED RELEASE ORAL DAILY
Qty: 30 CAPSULE | Refills: 2 | Status: SHIPPED | OUTPATIENT
Start: 2021-07-13 | End: 2022-01-06

## 2021-07-13 RX ORDER — CYANOCOBALAMIN 1000 UG/ML
INJECTION, SOLUTION INTRAMUSCULAR; SUBCUTANEOUS WEEKLY
COMMUNITY
Start: 2021-06-23 | End: 2022-01-06

## 2021-07-13 RX ORDER — SPIRONOLACTONE 50 MG/1
50 TABLET, FILM COATED ORAL DAILY
COMMUNITY
Start: 2021-07-09 | End: 2022-02-08

## 2021-07-13 NOTE — PATIENT INSTRUCTIONS
I have increased the tizanidine (muscle relaxant) to 4mg, you can take 1/2-1 tablet up to three times per day as needed. This may make you tired, so try the higher dose in the evening.    I have also ordered Cymbalta (duloxetine), this can help your brain 'turn off' the persistent pain signal, which may help get you some relief.  I have ordered an MRI of your elbow, you will get a phone call to schedule this as well as an appointment with the pain specialists.    Cervical Radiculopathy    Cervical radiculopathy happens when a nerve in the neck (a cervical nerve) is pinched or bruised. This condition can happen because of an injury to the cervical spine (vertebrae) in the neck, or as part of the normal aging process. Pressure on the cervical nerves can cause pain or numbness that travels from the neck all the way down into the arm and fingers. Usually, this condition gets better with rest. Treatment may be needed if the condition does not improve.  What are the causes?  This condition may be caused by:  · A neck injury.  · A bulging (herniated) disk.  · Muscle spasms.  · Muscle tightness in the neck because of overuse.  · Arthritis.  · Breakdown or degeneration in the bones and joints of the spine (spondylosis) due to aging.  · Bone spurs that may develop near the cervical nerves.  What are the signs or symptoms?  Symptoms of this condition include:  · Pain. The pain may travel from the neck to the arm and hand. The pain can be severe or irritating. It may be worse when you move your neck.  · Numbness or tingling in your arm or hand.  · Weakness in the affected arm and hand, in severe cases.  How is this diagnosed?  This condition may be diagnosed based on your symptoms, your medical history, and a physical exam. You may also have tests, including:  · X-rays.  · A CT scan.  · An MRI.  · An electromyogram (EMG).  · Nerve conduction tests.  How is this treated?  In many cases, treatment is not needed for this  condition. With rest, the condition usually gets better over time. If treatment is needed, options may include:  · Wearing a soft neck collar (cervical collar) for short periods of time, as told by your health care provider.  · Doing physical therapy to strengthen your neck muscles.  · Taking medicines, such as NSAIDs or oral corticosteroids.  · Having spinal injections, in severe cases.  · Having surgery. This may be needed if other treatments do not help. Different types of surgery may be done depending on the cause of this condition.  Follow these instructions at home:  If you have a cervical collar:  · Wear it as told by your health care provider. Remove it only as told by your health care provider.  · Ask your health care provider if you can remove the collar for cleaning and bathing. If you are allowed to remove the collar for cleaning or bathing:  ? Follow instructions from your health care provider about how to remove the collar safely.  ? Clean the collar by wiping it with mild soap and water and drying it completely.  ? Take out any removable pads in the collar every 1-2 days, and wash them by hand with soap and water. Let them air-dry completely before you put them back in the collar.  ? Check your skin under the collar for irritation or sores. If you see any, tell your health care provider.  Managing pain         · Take over-the-counter and prescription medicines only as told by your health care provider.  · If directed, put ice on the affected area.  ? If you have a soft neck collar, remove it as told by your health care provider.  ? Put ice in a plastic bag.  ? Place a towel between your skin and the bag.  ? Leave the ice on for 20 minutes, 2-3 times a day.  · If applying ice does not help, you can try using heat. Use the heat source that your health care provider recommends, such as a moist heat pack or a heating pad.  ? Place a towel between your skin and the heat source.  ? Leave the heat on for  20-30 minutes.  ? Remove the heat if your skin turns bright red. This is especially important if you are unable to feel pain, heat, or cold. You may have a greater risk of getting burned.  · Try a gentle neck and shoulder massage to help relieve symptoms.  Activity  · Rest as needed.  · Return to your normal activities as told by your health care provider. Ask your health care provider what activities are safe for you.  · Do stretching and strengthening exercises as told by your health care provider or physical therapist.  · Do not lift anything that is heavier than 10 lb (4.5 kg) until your health care provider tells you that it is safe.  General instructions  · Use a flat pillow when you sleep.  · Do not drive while wearing a cervical collar. If you do not have a cervical collar, ask your health care provider if it is safe to drive while your neck heals.  · Ask your health care provider if the medicine prescribed to you requires you to avoid driving or using heavy machinery.  · Do not use any products that contain nicotine or tobacco, such as cigarettes, e-cigarettes, and chewing tobacco. These can delay healing. If you need help quitting, ask your health care provider.  · Keep all follow-up visits as told by your health care provider. This is important.  Contact a health care provider if:  · Your condition does not improve with treatment.  Get help right away if:  · Your pain gets much worse and cannot be controlled with medicines.  · You have weakness or numbness in your hand, arm, face, or leg.  · You have a high fever.  · You have a stiff, rigid neck.  · You lose control of your bowels or your bladder (have incontinence).  · You have trouble with walking, balance, or speaking.  Summary  · Cervical radiculopathy happens when a nerve in the neck is pinched or bruised.  · A nerve can get pinched from a bulging disk, arthritis, muscle spasms, or an injury to the neck.  · Symptoms include pain, tingling, or  numbness radiating from the neck into the arm or hand. Weakness can also occur in severe cases.  · Treatment may include rest, wearing a cervical collar, and physical therapy. Medicines may be prescribed to help with pain. In severe cases, injections or surgery may be needed.  This information is not intended to replace advice given to you by your health care provider. Make sure you discuss any questions you have with your health care provider.  Document Revised: 11/08/2019 Document Reviewed: 11/08/2019  [email or web address] Patient Education © 2021 [employer]    Elbow Bursitis    Bursitis is swelling and pain at the tip of the elbow. This happens when fluid builds up in a sac under the skin (bursa). This may also be called olecranon bursitis.  What are the causes?  Elbow bursitis may be caused by:  · Elbow injury, such as falling onto the elbow.  · Leaning on hard surfaces for long periods of time.  · Infection from an injury that breaks the skin near the elbow.  · A bone growth (spur) that forms at the tip of the elbow.  · A medical condition that causes inflammation, such as gout or rheumatoid arthritis.  Sometimes the cause is not known.  What are the signs or symptoms?  The first sign of elbow bursitis is usually swelling at the tip of the elbow. This can grow to be about the size of a golf ball. Swelling may start suddenly or develop gradually. Other symptoms may include:  · Pain when bending or leaning on the elbow.  · Not being able to move the elbow normally.  If bursitis is caused by an infection, you may have:  · Redness, warmth, and tenderness of the elbow.  · Drainage of pus from the swollen area over the elbow, if the skin breaks open.  How is this diagnosed?  This condition may be diagnosed based on:  · Your symptoms and medical history.  · Any recent injuries you have had.  · A physical exam.  · X-rays to check for a bone spur or fracture.  · Draining fluid from the bursa to test it for infection.  · Blood  tests to rule out gout or rheumatoid arthritis.  How is this treated?  Treatment for elbow bursitis depends on the cause. Treatment may include:  · Medicines. These may include:  ? Over-the-counter medicines to relieve pain and inflammation.  ? Antibiotic medicines.  ? Injections of anti-inflammatory medicines (steroids).  · Draining fluid from the bursa.  · Wrapping your elbow with a bandage.  · Wearing elbow pads.  If these treatments do not help, you may need surgery to remove the bursa.  Follow these instructions at home:  Medicines  · Take over-the-counter and prescription medicines only as told by your health care provider.  · If you were prescribed an antibiotic medicine, take it as told by your health care provider. Do not stop taking the antibiotic even if you start to feel better.  Managing pain, stiffness, and swelling    · If directed, put ice on your elbow:  ? Put ice in a plastic bag.  ? Place a towel between your skin and the bag.  ? Leave the ice on for 20 minutes, 2-3 times a day.  · If your bursitis is caused by an injury, rest your elbow and wear your bandage as told by your health care provider.  · Use elbow pads or elbow wraps to cushion your elbow as needed.  General instructions  · Avoid any activities that cause elbow pain. Ask your health care provider what activities are safe for you.  · Keep all follow-up visits as told by your health care provider. This is important.  Contact a health care provider if you have:  · A fever.  · Symptoms that do not get better with treatment.  · Pain or swelling that:  ? Gets worse.  ? Goes away and then comes back.  · Pus draining from your elbow.  Get help right away if you have:  · Trouble moving your arm, hand, or fingers.  Summary  · Elbow bursitis is inflammation of the fluid-filled sac (bursa) between the tip of your elbow bone (olecranon) and your skin.  · Treatment for elbow bursitis depends on the cause. It may include medicines to relieve pain and  inflammation, antibiotic medicines, and draining fluid from your elbow.  · Contact a health care provider if your symptoms do not get better with treatment, or if your symptoms go away and then come back.  This information is not intended to replace advice given to you by your health care provider. Make sure you discuss any questions you have with your health care provider.  Document Revised: 11/30/2018 Document Reviewed: 11/27/2018  Elsevier Patient Education © 2021 [employer]

## 2021-07-13 NOTE — PROGRESS NOTES
Follow Up Neurology Office Visit      Patient Name: Lyla Castaneda    Referring Physician: No ref. provider found    Chief Complaint:    Chief Complaint   Patient presents with   • Follow-up     pt in office for worsening pain in right elbow.         History of Present Illness: Lyla Castaneda is a 45 y.o. female who is here to follow up with Neurology for pain in elbow and neck.      Pain has been present in right elbow for several months. Describes as swelling,aching, tingling. She has been evaluated by ortho, elbow X-ray and EMG of BUE unremarkable. Did have an injection which provided about three months of relief.      Also neck pain, has been using tizanidine at night and has undergone physical therapy.     Steroid as previously prescribed  not helpful    The following portions of the patient's history were reviewed and updated as appropriate: allergies, current medications, past family history, past medical history, past social history, past surgical history and problem list.    Subjective     Review of Systems:   Review of Systems   Constitutional: Negative for activity change and fatigue.   HENT: Negative for drooling and voice change.    Eyes: Negative for blurred vision, double vision, photophobia and visual disturbance.   Respiratory: Negative for shortness of breath.    Cardiovascular: Negative for chest pain and palpitations.   Gastrointestinal: Negative for nausea and vomiting.   Genitourinary: Negative for urinary incontinence.   Musculoskeletal: Negative for arthralgias, back pain, gait problem and myalgias.   Allergic/Immunologic: Negative for immunocompromised state.   Neurological: Positive for weakness and numbness. Negative for dizziness, tremors, seizures, syncope, facial asymmetry, speech difficulty, light-headedness, headache, memory problem and confusion.   Hematological: Does not bruise/bleed easily.   Psychiatric/Behavioral: Negative for decreased concentration, dysphoric mood,  hallucinations, sleep disturbance, depressed mood and stress. The patient is not nervous/anxious.      Medications:     Current Outpatient Medications:   •  cetirizine (zyrTEC) 10 MG tablet, TAKE 1 TABLET BY MOUTH DAILY FOR ALLERGY OR SINUS, Disp: , Rfl:   •  cyanocobalamin 1000 MCG/ML injection, 1 (One) Time Per Week., Disp: , Rfl:   •  Epinastine HCl 0.05 % ophthalmic solution, Administer 1 drop to both eyes 2 (Two) Times a Day., Disp: , Rfl:   •  fluticasone (FLONASE) 50 MCG/ACT nasal spray, 2 sprays into the nostril(s) as directed by provider Daily., Disp: , Rfl:   •  meloxicam (Mobic) 7.5 MG tablet, Take 1 tablet by mouth Daily., Disp: 30 tablet, Rfl: 2  •  NP Thyroid 90 MG tablet, Take 90 mg by mouth Daily., Disp: , Rfl:   •  Progesterone (PROMETRIUM) 100 MG capsule, Take 100 mg by mouth every night at bedtime., Disp: , Rfl:   •  spironolactone (ALDACTONE) 50 MG tablet, Take 50 mg by mouth Daily., Disp: , Rfl:   •  vitamin D3 125 MCG (5000 UT) capsule capsule, Take 5,000 Units by mouth Daily., Disp: , Rfl:   •  DULoxetine (Cymbalta) 30 MG capsule, Take 1 capsule by mouth Daily., Disp: 30 capsule, Rfl: 2  •  TiZANidine (ZANAFLEX) 4 MG capsule, Take 1 capsule by mouth 3 (Three) Times a Day As Needed for Muscle Spasms., Disp: 90 capsule, Rfl: 1    Allergies:   Allergies   Allergen Reactions   • Hydrocodone Nausea Only     Itching     • Morphine Itching     Objective     Physical Exam:  Vital Signs:   Vitals:    07/13/21 1046   BP: 110/78   Pulse: 88   Temp: 96.9 °F (36.1 °C)   SpO2: 98%   Weight: 102 kg (225 lb 6.4 oz)   PainSc:   9   PainLoc: Elbow  Comment: right elbow       Physical Exam  Vitals and nursing note reviewed.   Musculoskeletal:      Right elbow: No deformity or effusion. Tenderness present in lateral epicondyle.   Neurological:      Mental Status: She is oriented to person, place, and time.      Gait: Gait is intact.      Deep Tendon Reflexes: Strength normal.      Reflex Scores:       Bicep  reflexes are 2+ on the right side and 2+ on the left side.  Psychiatric:         Speech: Speech normal.         Neurologic Exam     Mental Status   Oriented to person, place, and time.   Attention: normal. Concentration: normal.   Speech: speech is normal   Level of consciousness: alert  Knowledge: good.   Normal comprehension.     Cranial Nerves   Cranial nerves II through XII intact.     Motor Exam   Muscle bulk: normal  Overall muscle tone: normal    Strength   Strength 5/5 throughout.     Sensory Exam   Light touch normal.     Gait, Coordination, and Reflexes     Gait  Gait: normal    Tremor   Resting tremor: absent    Reflexes   Right biceps: 2+  Left biceps: 2+    MRI CERVICAL SPINE WO CONTRAST-   HISTORY: cervical radiculopathy; M54.12-Radiculopathy, cervical region;  M70.31-Other bursitis of elbow, right elbow     COMPARISON: None.     TECHNIQUE: Multiplanar multisequence imaging of the cervical spine was  performed without intravenous contrast.     FINDINGS: The cervical vertebral bodies maintain a normal height,  alignment and signal intensity. The posterior elements are intact.      At the C3/4 level there is a broad-based disc bulge, however there is no  significant spinal stenosis or neural foraminal narrowing.     At the C4/5 level there is a broad-based disc bulge which produces mild  central stenosis. There is no significant neural foraminal narrowing.     At the C5/6 level there is a central disc protrusion which produces mild  central stenosis. There is no significant neural foraminal narrowing.     . The cervical portions of the spinal cord maintains a normal caliber  and signal intensity. The visualized posterior fossa is normal. The  paraspinal soft tissues are unremarkable.     XRAY ELBOW January 2021  IMPRESSION:  Degenerative change in the mid and lower cervical spine as  discussed above.  3 views right elbow indication pain, no comparison prior films, impression: No sign of fracture  arthritis or significant soft tissue abnormalities    Results Review:   I reviewed the patient's new clinical results.  I have reviewed the patient's other medical records to include, labs, radiology and referrals.   I reviewed the patient's new imaging results and agree with the interpretation.  Previous provider notes reviewed    Assessment / Plan      Assessment/Plan:   Diagnoses and all orders for this visit:    1. Cervical radiculopathy (Primary)  -     TiZANidine (ZANAFLEX) 4 MG capsule; Take 1 capsule by mouth 3 (Three) Times a Day As Needed for Muscle Spasms.  Dispense: 90 capsule; Refill: 1  -     Ambulatory Referral to Pain Management  -     DULoxetine (Cymbalta) 30 MG capsule; Take 1 capsule by mouth Daily.  Dispense: 30 capsule; Refill: 2    2. Bursitis of right elbow, unspecified bursa  -     Ambulatory Referral to Pain Management  -     MRI elbow right wo contrast; Future  -     DULoxetine (Cymbalta) 30 MG capsule; Take 1 capsule by mouth Daily.  Dispense: 30 capsule; Refill: 2     Patient has had persistent pain, swelling in right elbow, no improvement with NSAID (meloxicam) and ortho workup unremarkable. MRI has been ordered to evaluate for soft tissue abnormality which may be contributing to symptoms. Patient has had chronic neck pain which has not been responsive to physical therapy, NSAIDs, and muscle relaxant. Referral placed to pain management for additional treatment options.    Follow Up:   Return if symptoms worsen or fail to improve.     MARLON Crooks  [facility]   AS THE PROVIDER, I PERSONALLY WORE PPE DURING ENTIRE FACE TO FACE ENCOUNTER IN CLINIC WITH THE PATIENT. PATIENT ALSO WORE PPE DURING ENTIRE FACE TO FACE ENCOUNTER EXCEPT FOR A MAX OF 30 SECONDS DURING NEUROLOGICAL EVALUATION OF CRANIAL NERVES AND THEN MASK WAS PLACED BACK OVER PATIENT FACE FOR REMAINDER OF VISIT. I WASHED MY HANDS BEFORE AND AFTER VISIT.    Please note that portions of this note may  have been completed with a voice recognition program. Efforts were made to edit the dictations, but occasionally words are mistranscribed.

## 2021-08-04 ENCOUNTER — HOSPITAL ENCOUNTER (OUTPATIENT)
Dept: MRI IMAGING | Facility: HOSPITAL | Age: 46
Discharge: HOME | End: 2021-08-04
Admitting: NURSE PRACTITIONER

## 2021-08-04 DIAGNOSIS — M70.31 BURSITIS OF RIGHT ELBOW, UNSPECIFIED BURSA: ICD-10-CM

## 2021-08-04 PROCEDURE — 73221 MRI JOINT UPR EXTREM W/O DYE: CPT

## 2021-08-05 ENCOUNTER — TELEPHONE (OUTPATIENT)
Dept: NEUROLOGY | Facility: CLINIC | Age: 46
End: 2021-08-05

## 2021-08-05 DIAGNOSIS — S56.519A PARTIAL TEAR OF COMMON EXTENSOR TENDON OF ELBOW: Primary | ICD-10-CM

## 2021-08-05 NOTE — TELEPHONE ENCOUNTER
I called patient this morning and reviewed results of elbow MRI, which did demonstrate tendon tear. She works in [place], and has requested appointment at [facility]. Referral placed.

## 2021-08-17 ENCOUNTER — OFFICE VISIT (OUTPATIENT)
Dept: ORTHOPEDIC SURGERY | Facility: CLINIC | Age: 46
End: 2021-08-17

## 2021-08-17 VITALS
DIASTOLIC BLOOD PRESSURE: 86 MMHG | HEART RATE: 103 BPM | SYSTOLIC BLOOD PRESSURE: 137 MMHG | BODY MASS INDEX: 35.31 KG/M2 | HEIGHT: 67 IN | WEIGHT: 225 LBS

## 2021-08-17 DIAGNOSIS — M77.11 LATERAL EPICONDYLITIS OF RIGHT ELBOW: Primary | ICD-10-CM

## 2021-08-17 DIAGNOSIS — S56.519A PARTIAL TEAR OF COMMON EXTENSOR TENDON OF ELBOW: ICD-10-CM

## 2021-08-17 PROCEDURE — 99214 OFFICE O/P EST MOD 30 MIN: CPT | Performed by: ORTHOPAEDIC SURGERY

## 2021-08-17 RX ORDER — CEPHALEXIN 500 MG/1
TABLET ORAL
COMMUNITY
Start: 2021-08-13 | End: 2021-10-19

## 2021-08-17 RX ORDER — ESTRADIOL 1 MG/1
1 TABLET ORAL DAILY
COMMUNITY
Start: 2021-08-11 | End: 2022-02-08

## 2021-08-17 NOTE — PROGRESS NOTES
[facility] Orthopaedic Surgery Office Visit - Torsten Cody MD    Office Visit       Patient Name: Lyla Castaneda    Chief Complaint:   Chief Complaint   Patient presents with   • Right Elbow - Pain       Referring Physician: Kristin Lopez A*  --I appreciate the referral    History of Present Illness:   Lyla Castaneda is a 46 y.o. female who presents with right body part: elbow Reason: pain.  Onset:Onset: atraumatic and gradual in nature. The issue has been ongoing for 10 month(s). Pain is a 10/10 on the pain scale. Pain is described as Pain Characterization: aching, burning and stabbing. Associated symptoms include Symptoms: pain and swelling. The pain is worse with sleeping and working; ice improve the pain. Previous treatments have included: NSAIDS, physical therapy and oral steroids.  I have reviewed the patient's history of present illness as noted/entered above.    I have reviewed the patient's past medical history, surgical history, social history, family history, medications, and allergies as noted in the electronic medical record and as noted/entered.  I have reviewed the patient's review of systems as noted/enter and updated as noted in the patient's HPI.    Persistent right elbow pain with common extensor tendon origin tear right lateral epicondylitis worsening over the last 10 months rates the pain persistently is a 10-10 severely limiting basic ADLs    I reviewed her neurology note, her orthopedic note on 2/9/2021.  She is originally diagnosed with bilateral carpal tunnel syndrome possible cervical radiculopathy but she had an EMG/NCV that was reportedly within normal limits and satisfactory cervical spine MRI with some degenerative changes.  Ultimately she had a right elbow MRI which confirmed common extensor tendon origin tear.    The patient desires to discuss right elbow surgery      Subjective   Subjective      Review of Systems    Constitutional: Negative.  Negative for chills, fatigue and fever.   HENT: Negative.  Negative for congestion and dental problem.    Eyes: Negative.  Negative for blurred vision.   Respiratory: Negative.  Negative for shortness of breath.    Cardiovascular: Negative.  Negative for leg swelling.   Gastrointestinal: Negative.  Negative for abdominal pain.   Endocrine: Negative.  Negative for polyuria.   Genitourinary: Negative.  Negative for difficulty urinating.   Musculoskeletal: Positive for arthralgias.   Skin: Negative.    Allergic/Immunologic: Negative.    Neurological: Negative.    Hematological: Negative.  Negative for adenopathy.   Psychiatric/Behavioral: Negative.  Negative for behavioral problems.        Past Medical History: History reviewed. No pertinent past medical history.    Past Surgical History:   Past Surgical History:   Procedure Laterality Date   • GALLBLADDER SURGERY     • TUBAL ABDOMINAL LIGATION         Family History:   Family History   Problem Relation Age of Onset   • Hypertension Father    • Diabetes Father        Social History:   Social History     Socioeconomic History   • Marital status:      Spouse name: Not on file   • Number of children: Not on file   • Years of education: Not on file   • Highest education level: Not on file   Tobacco Use   • Smoking status: Never Smoker   • Smokeless tobacco: Never Used   Vaping Use   • Vaping Use: Never used   Substance and Sexual Activity   • Alcohol use: Not Currently   • Drug use: Never   • Sexual activity: Defer       Medications:   Current Outpatient Medications:   •  Cephalexin 500 MG tablet, TAKE 1 TABLET BY MOUTH FOUR TIMES DAILY UNTIL ALL TAKEN, Disp: , Rfl:   •  cetirizine (zyrTEC) 10 MG tablet, TAKE 1 TABLET BY MOUTH DAILY FOR ALLERGY OR SINUS, Disp: , Rfl:   •  cyanocobalamin 1000 MCG/ML injection, 1 (One) Time Per Week., Disp: , Rfl:   •  DULoxetine (Cymbalta) 30 MG capsule, Take 1 capsule by mouth Daily., Disp: 30 capsule,  "Rfl: 2  •  Epinastine HCl 0.05 % ophthalmic solution, Administer 1 drop to both eyes 2 (Two) Times a Day., Disp: , Rfl:   •  estradiol (ESTRACE) 1 MG tablet, Take 1 mg by mouth Daily. as directed, Disp: , Rfl:   •  fluticasone (FLONASE) 50 MCG/ACT nasal spray, 2 sprays into the nostril(s) as directed by provider Daily., Disp: , Rfl:   •  meloxicam (Mobic) 7.5 MG tablet, Take 1 tablet by mouth Daily., Disp: 30 tablet, Rfl: 2  •  NP Thyroid 90 MG tablet, Take 90 mg by mouth Daily., Disp: , Rfl:   •  Progesterone (PROMETRIUM) 100 MG capsule, Take 100 mg by mouth every night at bedtime., Disp: , Rfl:   •  spironolactone (ALDACTONE) 50 MG tablet, Take 50 mg by mouth Daily., Disp: , Rfl:   •  TiZANidine (ZANAFLEX) 4 MG capsule, Take 1 capsule by mouth 3 (Three) Times a Day As Needed for Muscle Spasms., Disp: 90 capsule, Rfl: 1  •  vitamin D3 125 MCG (5000 UT) capsule capsule, Take 5,000 Units by mouth Daily., Disp: , Rfl:     Allergies:   Allergies   Allergen Reactions   • Hydrocodone Nausea Only     Itching     • Morphine Itching       The following portions of the patient's history were reviewed and updated as appropriate: allergies, current medications, past family history, past medical history, past social history, past surgical history and problem list.        Objective    Objective      Vital Signs:   Vitals:    08/17/21 0752   BP: 137/86   Pulse: 103   Weight: 102 kg (225 lb)   Height: 170.2 cm (67.01\")       Ortho Exam:  General: no acute distress, comfortable  Vitals reviewed in chart  Head: normocephalic, atraumatic  Ears: no external drainage noted  Eyes: extraocular muscles appear intact with good tracking  Psych: alert and oriented, normal appearing mood  Vascular: 2+ pulses symmetric, well perfused  Neurologic:  Sensation to light touch intact distally today.  Counseled that any neurological symptoms distally would be unrelated to the common extensor tendon origin and/would not change with any right elbow " surgery      Specific Musculoskeletal Exam:    SIDE: RIGHT    ELBOW:  ROM: 0-140  Pronation: 90  Supination: 80  Varus stress: negative  Valgus stress: negative  Moving valgus stress test: negative  Posterior drawer: negative  Lateral epicondyle: tenderness to palpation and pain with resisted wrist extension  Medial epicondyle: nontender  Radial head: negative    Elbow flexion/elbow extension/wrist flexion/wrist extension/hand intrinsics: 5/5 but wrist extension is painful    Median/radial/ulnar nerves: intact  AIN: intact  PIN: intact    Tinel's: negative      Results Review:   Imaging Results (Last 24 Hours)     ** No results found for the last 24 hours. **        MRI Cervical Spine Without Contrast    Result Date: 6/10/2021  Degenerative change in the mid and lower cervical spine as discussed above.  This report was finalized on 6/10/2021 7:53 AM by Tessa Valencia M.D..    MRI Elbow Right Without Contrast    Result Date: 8/5/2021  Intrasubstance tear of the common extensor tendon.  This report was finalized on 8/5/2021 7:59 AM by Tessa Valencia M.D..      I personally interpreted/reviewed the right elbow MRI she does have intrasubstance tearing and insertional sided tear of the common extensor tendon origin.  Procedures     Patient is already had right elbow injection does not desire to proceed with additional injection        Assessment / Plan      Assessment/Plan:   Problem List Items Addressed This Visit        Musculoskeletal and Injuries    Lateral epicondylitis of right elbow - Primary    Relevant Orders    External Facility Surgical/Procedural Request    Partial tear of common extensor tendon of elbow    Relevant Orders    External Facility Surgical/Procedural Request          Right Elbow pain - lateral epicondylitis     Selective rest/activity modifications recommended while the elbow recovers.    Counseling - I counseled the patient on the diagnosis and treatment plan.  All questions were  answered.    The patient has exhausted conservative measures    Activity modifications -completed    Wrist immobilizer - discussed wrist immobilizer to limit wrist extension and to help with activity modifications; a wrist brace is indicated in the circumstance as it prevents engagement of the common extensor tendon origin and diminishes pain of the lateral epicondyle.  Wrist immobilizer is often used in the postoperative recovery.  She will bring the wrist brace to surgery with her    NSAIDs -completed    Physical therapy -completed    Corticosteroid injection -completed    Persistent pain and confirmation of common extensor tendon origin tearing.  Patient desires to proceed with surgery which is reasonable at this point counseled on risks and benefits counseled on Covid in the current surge.  Patient desires to proceed we will perform ambulatory/outpatient surgery      RIGHT lateral elbow tendon debridement, soft tissue and/or bone, open with tendon repair or reattachment CPT code 35208      Follow Up: Right elbow surgery, patient will bring wrist brace with her        Torsten Cody MD, FAAOS  Orthopedic Surgeon  Fellowship Trained Shoulder and Elbow Surgeon  [facility]  Orthopedics and Sports Medicine  [address] [place] [address], [address]  [place] [address]    08/17/21  08:25 EDT    Please note that portions of this note may have been completed with a voice recognition program. Efforts were made to edit the dictations, but occasionally words are mistranscribed.

## 2021-10-01 ENCOUNTER — APPOINTMENT (OUTPATIENT)
Dept: PREADMISSION TESTING | Facility: HOSPITAL | Age: 46
End: 2021-10-01

## 2021-10-01 PROCEDURE — C9803 HOPD COVID-19 SPEC COLLECT: HCPCS

## 2021-10-01 PROCEDURE — U0004 COV-19 TEST NON-CDC HGH THRU: HCPCS

## 2021-10-02 LAB — SARS-COV-2 RNA PNL SPEC NAA+PROBE: NOT DETECTED

## 2021-10-04 ENCOUNTER — DOCUMENTATION (OUTPATIENT)
Dept: ORTHOPEDIC SURGERY | Facility: CLINIC | Age: 46
End: 2021-10-04

## 2021-10-04 ENCOUNTER — OUTSIDE FACILITY SERVICE (OUTPATIENT)
Dept: ORTHOPEDIC SURGERY | Facility: CLINIC | Age: 46
End: 2021-10-04

## 2021-10-04 ENCOUNTER — TELEPHONE (OUTPATIENT)
Dept: ORTHOPEDIC SURGERY | Facility: CLINIC | Age: 46
End: 2021-10-04

## 2021-10-04 DIAGNOSIS — M77.11 LATERAL EPICONDYLITIS OF RIGHT ELBOW: Primary | ICD-10-CM

## 2021-10-04 DIAGNOSIS — S56.519A PARTIAL TEAR OF COMMON EXTENSOR TENDON OF ELBOW: ICD-10-CM

## 2021-10-04 PROCEDURE — 24359 REPAIR ELBOW DEB/ATTCH OPEN: CPT | Performed by: ORTHOPAEDIC SURGERY

## 2021-10-04 RX ORDER — OXYCODONE AND ACETAMINOPHEN 7.5; 325 MG/1; MG/1
1 TABLET ORAL EVERY 6 HOURS PRN
Qty: 28 TABLET | Refills: 0 | Status: SHIPPED | OUTPATIENT
Start: 2021-10-04 | End: 2021-10-11

## 2021-10-04 RX ORDER — ONDANSETRON 4 MG/1
4 TABLET, FILM COATED ORAL EVERY 6 HOURS PRN
Qty: 30 TABLET | Refills: 1 | Status: SHIPPED | OUTPATIENT
Start: 2021-10-04 | End: 2021-10-12

## 2021-10-04 RX ORDER — TRAMADOL HYDROCHLORIDE 50 MG/1
50 TABLET ORAL EVERY 4 HOURS PRN
Qty: 42 TABLET | Refills: 0 | Status: SHIPPED | OUTPATIENT
Start: 2021-10-04 | End: 2021-10-11

## 2021-10-04 RX ORDER — METHOCARBAMOL 500 MG/1
500 TABLET, FILM COATED ORAL 3 TIMES DAILY PRN
Qty: 40 TABLET | Refills: 1 | Status: SHIPPED | OUTPATIENT
Start: 2021-10-04 | End: 2021-10-19

## 2021-10-04 NOTE — TELEPHONE ENCOUNTER
Dr. Cody sent me a staff message and advised that he has already spoken with the patient and provided a new pain medication prescription.     Vandana LOGAN

## 2021-10-04 NOTE — TELEPHONE ENCOUNTER
Left a voicemail for the patient and advised that Dr. Cody is currently in surgery today and I may not hear back from him until the morning.    Vandana LOGAN

## 2021-10-04 NOTE — TELEPHONE ENCOUNTER
Pt called in to see if a different pain medication can be called in. Stated that the tramadol won't touch the pain. Would like a call back as soon as possible please

## 2021-10-04 NOTE — PROGRESS NOTES
Operative Report     [facility]  [address] [place] [address]    [facility] OPERATIVE REPORT      Side: Right elbow    LOCATION: [facility]     Surgeon: Torsten Cody MD       Preoperative Diagnosis:  1.       Right lateral epicondylitis with common extensor tendon origin tear   Postoperative Diagnosis:  1.     SAME as preoperative diagnoses     Procedure:  1.       Right lateral epicondylitis debridement and common extensor tendon origin tear repair lateral elbow tendon debridement, soft tissue and/or bone, open with tendon repair or reattachment CPT code 55717          Admission status: elective surgery     COVID-19 Statement: The patient understands that the surgery is elective surgery.  I discussed with the patient the risks and benefits of proceeding with surgery vs delaying surgery during the COVID-19 pandemic. The risks we discussed included but were not limited to the risk of mickey a severe COVID-19 infection due to exposure within the hospital or during convalescence at home that may result in permanent injury or death.  The patient voiced understanding and gave their consent to proceed.  The patient desires to proceed.      Complications: None     EBL: Minimal     Specimen: None     Anesthesia: general anesthesia     Block: No block local injection at the end    Nonsterile tourniquet was utilized     Antibiotics: weight based IV antibiotics infused prior to incision     Time out: Time out was called and the patient, side, site, and intended procedures were confirmed.     Counts: Needle and sponge counts were correct prior to closure.     DVT prophylaxis: The patient will be weight bearing as tolerated on bilateral lower extremities postoperatively with no additional chemical DVT prophylaxis indicated.     Marked: The patient was marked with an indelible marker in the preoperative holding area confirming the correct side and  site.     History & Physical:   A history and physical examination was completed and updated in the preoperative holding area.     Consent: The patient signed the consent form for surgery with an understanding of the risks and benefits as outlined in the clinic and in the preoperative holding area.     Risks and Benefits: Specific risks and benefits discussed included - pain, bleeding, infection, injury to nerves or blood vessels, fracture, stiffness, failure to heal, revision surgery, complications of the block when administered, anesthetic complications, and medical complications associated with surgery.  COVID-19 awareness as noted and discussed.    Implants: [employer] 2.3 mm anchor and 2-0 Ethibond      Indications for procedure: Patient had MRI confirmed common extensor tendon origin tear and failed conservative course desire to proceed with surgery.    Procedure in Detail:  The patient was seen identified marked history physical updated no block local injection at the end transition to the operating room and underwent a general anesthetic.  Identified the lateral epicondyle and the known history of lateral epicondylitis and common extensor tendon tear origin confirmed on MRI.  After sterilely prepping and draping use an additional prep stick placed to turn nonsterile tourniquet up.  Made a standard lateral approach centered over the lateral epicondyle.  Identify the proper plane and opened up the fascial layer and identified the torn common extensor tendon origin tearing and gelatinous appearing of the ECRB.  Used a [place] blade to sharply debride down to healthy bleeding bone bed of the lateral epicondyle.  Prepared the tendon for repair.  Place a single anchor extra-articular early angled away from the joint had nice fixation with the 2.3 mm anchor used the free needle for an inverted fixation back down to the anchor for common extensor tendon origin repair and had the wrist with an extension while this  was performed.  Used reinforce interrupted 2-0 Ethibond sutures to reinforce the repair and fascial and tendinous plane closure.  Copiously irrigated to remove any bony fragments prior to repair and copiously irrigated after repair let the tourniquet down proceeded to reinforce the repair as well with the 2-0 Ethibond.  And then hemostasis was achieved, closed with 3-0 Vicryl, Monocryl, skin affix, local injection at the end, Steri-Strips.  Please with the repair and closure.  Patient did bring her wrist brace for a bulky postoperative bandage and a wrist brace fashion to keep the wrist in extension and a bulky bandage and sling to prevent undue motion at the elbow.  Patient was transported to recovery room in satisfactory condition.        Electronically signed by Torsten Cody MD, 10/04/21, 11:32 AM EDT.

## 2021-10-06 ENCOUNTER — TELEPHONE (OUTPATIENT)
Dept: ORTHOPEDIC SURGERY | Facility: CLINIC | Age: 46
End: 2021-10-06

## 2021-10-06 NOTE — TELEPHONE ENCOUNTER
"I spoke with the patient and advised per Dr. Cody, \"I personally called the patient and explained the strength of the Percocet she is taking every 6 hours we can change that to every 4 hours. This seems like a good plan for her she seemed comfortable by phone she will add ibuprofen as well pain should continue to improve gradually.\" She understood.    Vandana LOGAN   "

## 2021-10-06 NOTE — TELEPHONE ENCOUNTER
Provider: DR. CANNON  Caller: [employer]  Relationship to Patient: SELF    Pharmacy: [id number] - [place], [place] - [address] - [phone]  - [phone] [place]    Phone Number: [phone]     Reason for Call: PATIENT HAD RIGHT ELBOW TENDON REPAIR 10/04/21. STATES CURRENT MEDICATION IS NOT DOING ANYTHING FOR HER PAIN. POSSIBLY INQUIRING ABOUT HYDROCODONE, PATIENT STATES SHE'S NOT ALLERGIC TO IT BUT IT UPSETS HER STOMACH. PATIENT IS REALLY WANTING HYDROCODONE OR POSSIBLY ANY OTHER OPTION FOR PAIN. PLEASE ADVISE.

## 2021-10-06 NOTE — TELEPHONE ENCOUNTER
I spoke with the patient and advised that Dr. Cody has sent in a Rx for the Oxycodone as of 10/04 and that is the strongest medication that we can give her. She mentioned that the medication is not even touching the pain at all. I asked if she was taking any Ibuprofen in between in the pain medication and she advised that she is. She is wanting to know what more she can do to help with the pain.    Vandana LOGAN

## 2021-10-19 ENCOUNTER — OFFICE VISIT (OUTPATIENT)
Dept: ORTHOPEDIC SURGERY | Facility: CLINIC | Age: 46
End: 2021-10-19

## 2021-10-19 VITALS — TEMPERATURE: 97.5 F

## 2021-10-19 DIAGNOSIS — M77.11 LATERAL EPICONDYLITIS OF RIGHT ELBOW: Primary | ICD-10-CM

## 2021-10-19 DIAGNOSIS — S56.519A PARTIAL TEAR OF COMMON EXTENSOR TENDON OF ELBOW: ICD-10-CM

## 2021-10-19 PROCEDURE — 99024 POSTOP FOLLOW-UP VISIT: CPT | Performed by: ORTHOPAEDIC SURGERY

## 2021-10-19 RX ORDER — ACETAMINOPHEN 500 MG
500 TABLET ORAL EVERY 6 HOURS PRN
COMMUNITY

## 2021-10-19 NOTE — PROGRESS NOTES
[facility] Office Follow Up       Office Follow Up Visit       Patient Name: Lyla Castaneda    Chief Complaint:   Chief Complaint   Patient presents with   • Post-op Follow-up     1st P.O (Right lateral epicondyle debridement, and common extensor tendon origin repair 10/4/21)       Referring Physician: No ref. provider found    History of Present Illness:   It has been 2  week(s) since Lyla Castaneda's last visit. Lyla Castaneda returns to clinic today for F/U: postoperative follow-up of rightBody Part: elbowReason: Irrigation and debridement. The issue has been ongoing for 2 week(s). Lyla Castaneda rates HIS/HER: herpain at 6/10 on the pain scale. Previous/current treatments: nothing. Current symptoms:Symptoms: pain and same as prior visit. The pain is worse with any movement of the joint; resting and compression ace wrap improves the pain. Overall, he/she: sheis doing worse due to restricted activity.    I have reviewed the patient's history of present illness as noted/entered above.    I have reviewed the patient's past medical history, surgical history, social history, family history, medications, and allergies as noted in the electronic medical record and as noted/entered.  I have reviewed the patient's review of systems as noted/enter and updated as noted in the patient's HPI.      Preoperative Diagnosis:  1.       Right lateral epicondylitis with common extensor tendon origin tear   Postoperative Diagnosis:  1.     SAME as preoperative diagnoses     Procedure:  1.       Right lateral epicondylitis debridement and common extensor tendon origin tear repair lateral elbow tendon debridement, soft tissue and/or bone, open with tendon repair or reattachment CPT code 96335  Surgery 10/4/2021        10/19/2021:  Patient has some issues with postoperative pain medications as she carries a history of allergy to hydrocodone but ultimately tolerated  Percocet for that.  Seems to be doing well but has been difficult to be bound up following surgery which is understandable  Typical postoperative course  Desires to return to work -- [employer]      Prior: Persistent right elbow pain with common extensor tendon origin tear right lateral epicondylitis worsening over the last 10 months rates the pain persistently is a 10-10 severely limiting basic ADLs     I reviewed her neurology note, her orthopedic note on 2/9/2021.  She is originally diagnosed with bilateral carpal tunnel syndrome possible cervical radiculopathy but she had an EMG/NCV that was reportedly within normal limits and satisfactory cervical spine MRI with some degenerative changes.  Ultimately she had a right elbow MRI which confirmed common extensor tendon origin tear.     The patient desires to discuss right elbow surgery         Subjective   Subjective      Review of Systems   Musculoskeletal: Positive for arthralgias.   All other systems reviewed and are negative.       Past Medical History: No past medical history on file.    Past Surgical History:   Past Surgical History:   Procedure Laterality Date   • ELBOW PROCEDURE Right 10/04/2021    Right lateral epicondyle debridement, and common extensor tendon origin repair 10/4/21) Dr Torsten Cody, [facility]   • GALLBLADDER SURGERY     • TUBAL ABDOMINAL LIGATION         Family History:   Family History   Problem Relation Age of Onset   • Hypertension Father    • Diabetes Father        Social History:   Social History     Socioeconomic History   • Marital status: Single   Tobacco Use   • Smoking status: Never Smoker   • Smokeless tobacco: Never Used   Vaping Use   • Vaping Use: Never used   Substance and Sexual Activity   • Alcohol use: Not Currently   • Drug use: Never   • Sexual activity: Defer       Medications:   Current Outpatient Medications:   •  acetaminophen (TYLENOL) 500 MG tablet, Take 500 mg by mouth Every 6  (Six) Hours As Needed for Mild Pain ., Disp: , Rfl:   •  cetirizine (zyrTEC) 10 MG tablet, TAKE 1 TABLET BY MOUTH DAILY FOR ALLERGY OR SINUS, Disp: , Rfl:   •  cyanocobalamin 1000 MCG/ML injection, 1 (One) Time Per Week., Disp: , Rfl:   •  DULoxetine (Cymbalta) 30 MG capsule, Take 1 capsule by mouth Daily., Disp: 30 capsule, Rfl: 2  •  Epinastine HCl 0.05 % ophthalmic solution, Administer 1 drop to both eyes 2 (Two) Times a Day., Disp: , Rfl:   •  estradiol (ESTRACE) 1 MG tablet, Take 1 mg by mouth Daily. as directed, Disp: , Rfl:   •  fluticasone (FLONASE) 50 MCG/ACT nasal spray, 2 sprays into the nostril(s) as directed by provider Daily., Disp: , Rfl:   •  NP Thyroid 90 MG tablet, Take 90 mg by mouth Daily., Disp: , Rfl:   •  Progesterone (PROMETRIUM) 100 MG capsule, Take 100 mg by mouth every night at bedtime., Disp: , Rfl:   •  spironolactone (ALDACTONE) 50 MG tablet, Take 50 mg by mouth Daily., Disp: , Rfl:   •  vitamin D3 125 MCG (5000 UT) capsule capsule, Take 5,000 Units by mouth Daily., Disp: , Rfl:     Allergies:   Allergies   Allergen Reactions   • Hydrocodone Nausea Only     Itching     • Morphine Itching       The following portions of the patient's history were reviewed and updated as appropriate: allergies, current medications, past family history, past medical history, past social history, past surgical history and problem list.        Objective    Objective      Vital Signs:   Vitals:    10/19/21 1012   Temp: 97.5 °F (36.4 °C)       Ortho Exam:  RIGHT elbow incision well appearing, steris in place  Soreness as expected  SLT intact distally    Results Review:  Imaging Results (Last 24 Hours)     ** No results found for the last 24 hours. **          MRI Elbow Right Without Contrast    Result Date: 8/5/2021  Intrasubstance tear of the common extensor tendon.  This report was finalized on 8/5/2021 7:59 AM by Tessa Valencia M.D..      Procedures          Assessment / Plan      Assessment/Plan:    Problem List Items Addressed This Visit        Musculoskeletal and Injuries    Lateral epicondylitis of right elbow - Primary    Relevant Orders    Ambulatory Referral to Physical Therapy Ortho, Evaluate and treat, POST OP    Partial tear of common extensor tendon of elbow    Relevant Orders    Ambulatory Referral to Physical Therapy Ortho, Evaluate and treat, POST OP          RIGHT ELBOW  Counseled on postop recovery  Moving to [place] next month  [facility] PT Rx provided  She is exhausted a lot of PT before her surgery but still has a few visits left encouraged her to get plugged in as she transitions to [place].  To provide a work note as she knows she needs to return back and restrictions were provided I will see her back in 2 months    Counseled on lifting restriction, wrist brace over the next 4 to 6 weeks  Breaks to prevent restiffness      Follow Up: 2 months, no x-rays needed      Torsten Cody MD, FAAOS  Orthopedic Surgeon  Fellowship Trained Shoulder and Elbow Surgeon  [facility]  Orthopedics and Sports Medicine  [address], [address]  [place] [address]    10/19/21  10:59 EDT    Please note that portions of this note may have been completed with a voice recognition program. Efforts were made to edit the dictations, but occasionally words are mistranscribed.

## 2021-11-01 ENCOUNTER — HOSPITAL ENCOUNTER (OUTPATIENT)
Dept: PHYSICAL THERAPY | Facility: HOSPITAL | Age: 46
Setting detail: THERAPIES SERIES
Discharge: HOME | End: 2021-11-01

## 2021-11-01 DIAGNOSIS — S56.519A PARTIAL TEAR OF COMMON EXTENSOR TENDON OF ELBOW: Primary | ICD-10-CM

## 2021-11-01 PROCEDURE — 97161 PT EVAL LOW COMPLEX 20 MIN: CPT

## 2021-11-01 NOTE — THERAPY EVALUATION
Outpatient Physical Therapy Ortho Initial Evaluation  [facility]     Patient Name: Lyla Castaneda  : 1975  MRN: [id number]  Today's Date: 2021      Visit Date: 2021    Patient Active Problem List   Diagnosis   • Cervical radiculopathy   • Bursitis of right elbow   • Lateral epicondylitis of right elbow   • Partial tear of common extensor tendon of elbow         Past Surgical History:   Procedure Laterality Date   • ELBOW PROCEDURE Right 10/04/2021    Right lateral epicondyle debridement, and common extensor tendon origin repair 10/4/21) Dr Torsten Cody, [facility]   • GALLBLADDER SURGERY     • TUBAL ABDOMINAL LIGATION         Visit Dx:     ICD-10-CM ICD-9-CM   1. Partial tear of common extensor tendon of elbow  S56.519A 841.8          Patient History     Row Name 21 1700 10/30/21 1110          History    Chief Complaint Difficulty with daily activities; Joint stiffness; Muscle weakness; Muscle tenderness; Pain  -MM Difficulty with daily activities (P)    -patient     Type of Pain Elbow pain  -MM Elbow pain (P)    -patient     Date Current Problem(s) Began 10/04/21  -MM --     Brief Description of Current Complaint client presents s/p right common extensor origin repair and debridement 10/4/21. She is wearing a wrist brace. she notes that she has had elbow limited elbow extension and elbow pain for the past 10 months.  -MM --     Patient/Caregiver Goals Relieve pain; Return to prior level of function; Improve mobility; Improve strength  -MM Return to prior level of function; Improve strength; Heal wound (P)    -patient     Hand Dominance right-handed  -MM right-handed (P)    -patient     Occupation/sports/leisure activities . mother of 3 boys.  -MM --     What clinical tests have you had for this problem? -- Other 1 (comment) (P)    -patient     Additional Clinical Tests -- Surgery (P)    -patient     Are you or can you be pregnant -- No (P)    -patient             Pain     Pain Location Elbow  -MM --     Pain at Present 2  -MM --     Pain at Best 2  -MM --     Pain at Worst 9  -MM --     Pain Frequency Constant/continuous  -MM --     Pain Description Sharp; Aching; Discomfort; Burning  -MM --     Pain Comments mild pain at rest, but sharp and more severe pain with active right arm movement  -MM --     Difficulties with ADL's? unable to use right arm for lifting. difficulty typing  -MM --            Fall Risk Assessment    Any falls in the past year: No  -MM No (P)    -patient            Services    Prior Rehab/Home Health Experiences -- No (P)    -patient     Are you currently receiving Home Health services -- No (P)    -patient     Do you plan to receive Home Health services in the near future -- No (P)    -patient            Daily Activities    Primary Language -- English (P)    -patient     Are you able to read -- Yes (P)    -patient     Are you able to write -- Yes (P)    -patient     How does patient learn best? -- Listening; Reading; Demonstration (P)    -patient     Barriers to learning None  -MM --     Pt Participated in POC and Goals Yes  -MM --            Safety    Are you being hurt, hit, or frightened by anyone at home or in your life? -- No (P)    -patient     Are you being neglected by a caregiver -- No (P)    -patient     Have you had any of the following issues with -- N/A (P)    -patient           User Key  (r) = Recorded By, (t) = Taken By, (c) = Cosigned By    Initials Name Provider Type    MM Marco Garcia, PT Physical Therapist    patient Lyla Castaneda --                 PT Ortho     Row Name 11/01/21 1700       Precautions and Contraindications    Precautions Common extensor origin repair 10/4/2021  -MM       Posture/Observations    Posture/Observations Comments Incision appears to be closed.  Client does have mild swelling around her elbow.  No abnormal redness noted.  Client is wearing a wrist brace per MD instructions.  Palpation: Marked tenderness  around the lateral elbow and scar.  -MM       General ROM    RT Upper Ext Rt Elbow Extension/Flexion; Rt Elbow Supination; Rt Elbow Pronation; Rt Wrist Flexion; Rt Wrist Extension; Rt Ulnar Deviation; Rt Radial Deviation  -MM       Right Upper Ext    Rt Elbow Extension/Flexion AROM -48/132  -MM    Rt Elbow Supination AROM 87 degrees  -MM    Rt Elbow Pronation AROM Within normal limits  -MM    Rt Wrist Flexion AROM 69 degrees with elbow propped  -MM    Rt Wrist Extension AROM 73 degrees with elbow propped  -MM    Rt  Ulnar Deviation AROM Within normal limits  -MM    Rt  Radial Deviation AROM Within normal limits  -MM       MMT (Manual Muscle Testing)    General MMT Comments Deferred due to precautions  -MM          User Key  (r) = Recorded By, (t) = Taken By, (c) = Cosigned By    Initials Name Provider Type    MM Marco Garcia, PT Physical Therapist              Therapy Education  Education Details: HEP: Passive elbow extension propped on towel roll (3 hand positions wearing wrist brace), active range of motion elbow extension, gentle active wrist flexion/extension with elbow propped on pillow and flexed to 90, forearm pronation/supination active range of motion, light  with foam.  Given: HEP  Program: New  How Provided: Verbal  Provided to: Patient  Level of Understanding: Teach back education performed      PT OP Goals     Row Name 11/01/21 1700          PT Short Term Goals    STG Date to Achieve 11/22/21  -MM     STG 1 Client will report improvement in elbow pain to 3/10 or better with active wrist/elbow movement.  -MM     STG 1 Progress New  -MM     STG 2 Client will be independent with home program to normalize mobility.  -MM     STG 2 Progress New  -MM     STG 3 Right elbow extension active range of motion will improve to neutral.  -MM     STG 3 Progress New  -MM     STG 4 Client will improve to gripping door handles without difficulty.  -MM     STG 4 Progress New  -MM            Long Term Goals    LTG  Date to Achieve 12/13/21  -MM     LTG 1 Client be independent with home program to maximize strength and function.  -MM     LTG 1 Progress New  -MM     LTG 2 Client will improve to lifting 3 pounds with wrist extension without difficulty.  -MM     LTG 2 Progress New  -MM     LTG 3 QuickDASH score will improve to 10% or better.  -MM     LTG 3 Progress New  -MM            Time Calculation    PT Goal Re-Cert Due Date 01/30/22  -MM           User Key  (r) = Recorded By, (t) = Taken By, (c) = Cosigned By    Initials Name Provider Type    MM Marco Garcia, PT Physical Therapist                 PT Assessment/Plan     Row Name 11/01/21 1700          PT Assessment    Functional Limitations Limitation in home management; Performance in leisure activities; Performance in self-care ADL  -MM     Impairments Pain; Muscle strength; Range of motion; Joint mobility  -MM     Assessment Comments Client presents with evolving symptoms of low complexity.  Signs and symptoms are consistent with right elbow pain related to common extensor origin repair and debridement October 4, 2021.  Client has significant elbow stiffness with limited extension.  -MM     Please refer to paper survey for additional self-reported information Yes  -MM     Rehab Potential Good  -MM     Patient/caregiver participated in establishment of treatment plan and goals Yes  -MM     Patient would benefit from skilled therapy intervention Yes  -MM            PT Plan    PT Frequency 1x/week  -MM     Predicted Duration of Therapy Intervention (PT) 6-7 visits  -MM     Planned CPT's? PT EVAL LOW COMPLEXITY: 15875; PT THER PROC EA 15 MIN: 13776; PT THER ACT EA 15 MIN: 62558; PT MANUAL THERAPY EA 15 MIN: 02511  -MM     PT Plan Comments Continue per plan of treatment per protocol. work on gentle ROM of the wrist. Work on restoring full elbow ROM. Minimize stress on the repair.  -MM           User Key  (r) = Recorded By, (t) = Taken By, (c) = Cosigned By    Initials Name  Provider Type    MM Marco Garcia, PT Physical Therapist                   Outcome Measure Options: Quick DASH  Quick DASH  Open a tight or new jar.: Unable  Do heavy household chores (e.g., wash walls, wash floors): Unable  Carry a shopping bag or briefcase: Severe Difficulty  Wash your back: Unable  Use a knife to cut food: Severe Difficulty  Recreational activities in which you take some force or impact through your arm, should or hand (e.g. golf, hammering, tennis, etc.): Unable  During the past week, to what extent has your arm, shoulder, or hand problem interfered with your normal social activites with family, friends, neighbors or groups?: Extremely  During the past week, were you limited in your work or other regular daily activities as a result of your arm, shoulder or hand problem?: Moderately Limited  Arm, Shoulder, or hand pain: Severe  Tingling (pins and needles) in your arm, shoulder, or hand: Moderate  During the past week, how much difficulty have you had sleeping because of the pain in your arm, shoulder or hand?: Moderate Difficiculty  Number of Questions Answered: 11  Quick DASH Score: 79.55         Time Calculation:     Start Time: 1700  Untimed Charges  PT Eval/Re-eval Minutes: 60  Total Minutes  Untimed Charges Total Minutes: 60   Total Minutes: 60     Therapy Charges for Today     Code Description Service Date Service Provider Modifiers Qty    [id number]  PT EVAL LOW COMPLEXITY 4 11/1/2021 Marco Garcia, PT GP 1          PT G-Codes  Outcome Measure Options: Quick DASH  Quick DASH Score: 79.55         Marco Garcia, PT  11/1/2021

## 2021-11-12 ENCOUNTER — DOCUMENTATION (OUTPATIENT)
Dept: PHYSICAL THERAPY | Facility: HOSPITAL | Age: 46
End: 2021-11-12

## 2021-11-12 DIAGNOSIS — S56.519A PARTIAL TEAR OF COMMON EXTENSOR TENDON OF ELBOW: Primary | ICD-10-CM

## 2021-11-12 NOTE — THERAPY DISCHARGE NOTE
Outpatient Physical Therapy Discharge Summary         Patient Name: Lyla Castaneda  : 1975  MRN: [id number]    Today's Date: 2021    Visit Dx:    ICD-10-CM ICD-9-CM   1. Partial tear of common extensor tendon of elbow  S56.519A 841.8     Client was treated for the initial visit after common extensor tendon repair. She cancelled the other scheduled visits due to insurance costs. She was provided with a home program and should do well by following those instructions.      Marco Garcia, PT  2021

## 2022-01-06 ENCOUNTER — OFFICE VISIT (OUTPATIENT)
Dept: ORTHOPEDIC SURGERY | Facility: CLINIC | Age: 47
End: 2022-01-06

## 2022-01-06 VITALS — WEIGHT: 225 LBS | HEIGHT: 67 IN | BODY MASS INDEX: 35.31 KG/M2

## 2022-01-06 DIAGNOSIS — S56.519A PARTIAL TEAR OF COMMON EXTENSOR TENDON OF ELBOW: ICD-10-CM

## 2022-01-06 DIAGNOSIS — M77.11 LATERAL EPICONDYLITIS OF RIGHT ELBOW: Primary | ICD-10-CM

## 2022-01-06 PROCEDURE — 99213 OFFICE O/P EST LOW 20 MIN: CPT | Performed by: ORTHOPAEDIC SURGERY

## 2022-01-06 NOTE — PROGRESS NOTES
[facility] Orthopaedic Surgery Office Follow Up       Office Follow Up Visit       Patient Name: Lyla Castaneda    Chief Complaint:   Chief Complaint   Patient presents with   • Post-op     2month, Right lateral epicondyle debridement, and common extensor tendon origin repair 10/4/21       Referring Physician: No ref. provider found    History of Present Illness:   It has been 2  month(s) since Lyla Castaneda's last visit. Lyla Castaneda returns to clinic today for F/U: postoperative follow-up of rightBody Part: elbowReason: arthroscopy. The issue has been ongoing for 1 year(s). Lyla Castaneda rates HIS/HER: herpain at 7/10 on the pain scale. Previous/current treatments: bracing, physical therapy and steroid injection (last injection n/a). Current symptoms:Symptoms: pain, swelling and stiffness. The pain is worse with any movement of the joint; resting and pain medication and/or NSAID improves the pain. Overall, he/she: sheis doing the same. I have reviewed the patient's history of present illness as noted/entered above.    I have reviewed the patient's past medical history, surgical history, social history, family history, medications, and allergies as noted in the electronic medical record and as noted/entered.  I have reviewed the patient's review of systems as noted/enter and updated as noted in the patient's HPI.      Preoperative Diagnosis:  1.       Right lateral epicondylitis with common extensor tendon origin tear   Postoperative Diagnosis:  1.     SAME as preoperative diagnoses     Procedure:  1.       Right lateral epicondylitis debridement and common extensor tendon origin tear repair lateral elbow tendon debridement, soft tissue and/or bone, open with tendon repair or reattachment CPT code 36931  Surgery 10/4/2021        1/6/2021:  Patient presents a little bit delayed for 2-month follow-up.  She had a severe pain response preoperatively with 10 out of  10 severe pain she does still have some pain but would consider to be still in the relatively early recovery.  Switched jobs mid-November to a different staffing    It appears that she attended 1 initial appointment with Marco Garcia in physical therapy and then 1 additional appointment and then canceled the subsequent appointments.    I counseled on the need for physical therapy at this point additional recovery time therapy can be incredibly beneficial.    10/19/2021:  Patient has some issues with postoperative pain medications as she carries a history of allergy to hydrocodone but ultimately tolerated Percocet for that.  Seems to be doing well but has been difficult to be bound up following surgery which is understandable  Typical postoperative course  Desires to return to work -- [employer]        Prior: Persistent right elbow pain with common extensor tendon origin tear right lateral epicondylitis worsening over the last 10 months rates the pain persistently is a 10-10 severely limiting basic ADLs     I reviewed her neurology note, her orthopedic note on 2/9/2021.  She is originally diagnosed with bilateral carpal tunnel syndrome possible cervical radiculopathy but she had an EMG/NCV that was reportedly within normal limits and satisfactory cervical spine MRI with some degenerative changes.  Ultimately she had a right elbow MRI which confirmed common extensor tendon origin tear.     The patient desires to discuss right elbow surgery      Subjective   Subjective      Review of Systems   Musculoskeletal: Positive for arthralgias.   All other systems reviewed and are negative.       Past Medical History:   Past Medical History:   Diagnosis Date   • Arthritis of neck    • Knee swelling    • Lateral epicondylitis of right elbow        Past Surgical History:   Past Surgical History:   Procedure Laterality Date   • ELBOW PROCEDURE Right 10/04/2021    Right lateral epicondyle  debridement, and common extensor tendon origin repair 10/4/21) Dr Torsten Cody, [facility]   • GALLBLADDER SURGERY     • TUBAL ABDOMINAL LIGATION         Family History:   Family History   Problem Relation Age of Onset   • Hypertension Father    • Diabetes Father    • Anesthesia problems Father        Social History:   Social History     Socioeconomic History   • Marital status: Single   Tobacco Use   • Smoking status: Never Smoker   • Smokeless tobacco: Never Used   Vaping Use   • Vaping Use: Never used   Substance and Sexual Activity   • Alcohol use: Not Currently     Alcohol/week: 0.0 standard drinks   • Drug use: Never   • Sexual activity: Yes     Partners: Male     Birth control/protection: Condom       Medications:   Current Outpatient Medications:   •  acetaminophen (TYLENOL) 500 MG tablet, Take 500 mg by mouth Every 6 (Six) Hours As Needed for Mild Pain ., Disp: , Rfl:   •  cetirizine (zyrTEC) 10 MG tablet, TAKE 1 TABLET BY MOUTH DAILY FOR ALLERGY OR SINUS, Disp: , Rfl:   •  estradiol (ESTRACE) 1 MG tablet, Take 1 mg by mouth Daily. as directed, Disp: , Rfl:   •  fluticasone (FLONASE) 50 MCG/ACT nasal spray, 2 sprays into the nostril(s) as directed by provider Daily., Disp: , Rfl:   •  NP Thyroid 90 MG tablet, Take 90 mg by mouth Daily., Disp: , Rfl:   •  Progesterone (PROMETRIUM) 100 MG capsule, Take 100 mg by mouth every night at bedtime., Disp: , Rfl:   •  spironolactone (ALDACTONE) 50 MG tablet, Take 50 mg by mouth Daily., Disp: , Rfl:   •  vitamin D3 125 MCG (5000 UT) capsule capsule, Take 5,000 Units by mouth Daily., Disp: , Rfl:   •  cyanocobalamin 1000 MCG/ML injection, 1 (One) Time Per Week., Disp: , Rfl:   •  DULoxetine (Cymbalta) 30 MG capsule, Take 1 capsule by mouth Daily., Disp: 30 capsule, Rfl: 2  •  Epinastine HCl 0.05 % ophthalmic solution, Administer 1 drop to both eyes 2 (Two) Times a Day., Disp: , Rfl:     Allergies:   Allergies   Allergen Reactions   • Hydrocodone Nausea Only      "Itching     • Morphine Itching       The following portions of the patient's history were reviewed and updated as appropriate: allergies, current medications, past family history, past medical history, past social history, past surgical history and problem list.        Objective    Objective      Vital Signs:   Vitals:    01/06/22 0939   Weight: 102 kg (225 lb)   Height: 170.2 cm (67.01\")       Ortho Exam:  RIGHT elbow -- mild pain at insertion site, more forearm/dorsal, incision healed      Results Review:  Imaging Results (Last 24 Hours)     ** No results found for the last 24 hours. **          Procedures          Assessment / Plan      Assessment/Plan:   Problem List Items Addressed This Visit        Musculoskeletal and Injuries    Lateral epicondylitis of right elbow - Primary    Relevant Orders    Ambulatory Referral to Physical Therapy Ortho, Evaluate and treat, POST OP    Partial tear of common extensor tendon of elbow    Relevant Orders    Ambulatory Referral to Physical Therapy Ortho, Evaluate and treat, POST OP          RIGHT ELBOW    Counseled on PT to continue to help, avoiding heavy lift push/pull until cleared  I still think satisfactory result is possible with proper rehab  Some left shoulder pain -counseled on scapular home exercise program.  Upper trapezius strength    Follow Up: 2 months to reassess response to physical therapy.      Torsten Cody MD, FAAOS  Orthopedic Surgeon  Fellowship Trained Shoulder and Elbow Surgeon  [facility]  Orthopedics and Sports Medicine  [facility] [facility], [address]  [place] [address]    01/06/22  10:15 EST  Answers for HPI/ROS submitted by the patient on 1/6/2022  Please describe your symptoms.: Still have pain in my forearm like i did before my surgery  Have you had these symptoms before?: Yes  How long have you been having these symptoms?: Greater than 2 weeks  What is the primary reason for your visit?: Other      "

## 2022-01-13 ENCOUNTER — HOSPITAL ENCOUNTER (OUTPATIENT)
Dept: PHYSICAL THERAPY | Facility: HOSPITAL | Age: 47
Setting detail: THERAPIES SERIES
Discharge: HOME | End: 2022-01-13

## 2022-01-13 DIAGNOSIS — S56.519A PARTIAL TEAR OF COMMON EXTENSOR TENDON OF ELBOW: Primary | ICD-10-CM

## 2022-01-13 DIAGNOSIS — M77.11 LATERAL EPICONDYLITIS OF RIGHT ELBOW: ICD-10-CM

## 2022-01-13 PROCEDURE — 97161 PT EVAL LOW COMPLEX 20 MIN: CPT | Performed by: GENERAL ACUTE CARE HOSPITAL

## 2022-01-13 NOTE — THERAPY EVALUATION
Outpatient Physical Therapy Ortho Initial Evaluation   [place]     Patient Name: Lyla Castnaeda  : 1975  MRN: [id number]  Today's Date: 2022      Visit Date: 2022    Patient Active Problem List   Diagnosis   • Cervical radiculopathy   • Bursitis of right elbow   • Lateral epicondylitis of right elbow   • Partial tear of common extensor tendon of elbow        Past Medical History:   Diagnosis Date   • Arthritis of neck    • Knee swelling    • Lateral epicondylitis of right elbow         Past Surgical History:   Procedure Laterality Date   • ELBOW PROCEDURE Right 10/04/2021    Right lateral epicondyle debridement, and common extensor tendon origin repair 10/4/21) Dr Torsten Cody, [facility]   • GALLBLADDER SURGERY     • TUBAL ABDOMINAL LIGATION         Visit Dx:     ICD-10-CM ICD-9-CM   1. Partial tear of common extensor tendon of elbow  S56.519A 841.8   2. Lateral epicondylitis of right elbow  M77.11 726.32          Patient History     Row Name 22 0730 22 0724          History    Chief Complaint Burn; Difficulty with daily activities; Joint stiffness; Muscle tenderness; Muscle weakness; Tightness  -HP Burn; Difficulty with daily activities; Joint stiffness; Muscle tenderness; Muscle weakness; Tightness  -HP (r) patient (t)     Type of Pain Elbow pain  -HP Elbow pain  -HP (r) patient (t)     Date Current Problem(s) Began 10/04/21  -HP 10/04/21  -HP     Brief Description of Current Complaint Patient presents today with complaints of mild right elbow tenderness/pain.  Patient was recently seen by physical therapy but due to insurance reasons was unable to attend further visits.  After switching jobs patient was able to be seen today for follow-up of a extensor debridement of the right elbow on 10/4/2021.  -HP Patient presents today with complaints of mild right elbow tenderness/pain.  Patient was recently seen by physical therapy but due to insurance reasons was unable to attend  further visits.  After switching jobs patient was able to be seen today for follow-up of a extensor debridement of the right elbow on 10/4/2021.  -HP     Patient/Caregiver Goals Relieve pain; Return to prior level of function; Improve mobility; Improve strength; Decrease swelling  -HP Relieve pain; Return to prior level of function; Improve mobility; Improve strength; Decrease swelling  -HP (r) patient (t)     Hand Dominance right-handed  -HP right-handed  -HP (r) patient (t)     What clinical tests have you had for this problem? X-ray; MRI; Nerve Conduction Test; Other 1 (comment)  -HP X-ray; MRI; Nerve Conduction Test; Other 1 (comment)  -HP (r) patient (t)     Additional Clinical Tests Surgery  -HP Surgery  -HP     Are you or can you be pregnant No  -HP No  -HP (r) patient (t)            Pain     Pain Location Elbow  -HP Elbow  -HP     Pain at Present 6  -HP 6  -HP     Pain at Best 3  -HP 3  -HP     Pain at Worst 10  -HP 10  -HP     Pain Frequency Constant/continuous  -HP Constant/continuous  -HP     Pain Description Aching; Discomfort; Sharp; Sore; Tightness  -HP Aching; Discomfort; Sharp; Sore; Tightness  -HP     Pain Comments Pt notes pain along the extensor musculature and with elbow extension  -HP Pt notes pain along the extensor musculature and with elbow extension  -HP     Difficulties at work? Limited in lifting  -HP Limited in lifting  -HP     Difficulties with ADL's? Limited in carrying/lifting in R hand  -HP Limited in carrying/lifting in R hand  -HP            Fall Risk Assessment    Any falls in the past year: Yes  -HP Yes  -HP (r) patient (t)     Factors that contributed to the fall: Uneven surface  -HP Uneven surface  -HP (r) patient (t)            Services    Prior Rehab/Home Health Experiences No  -HP No  -HP (r) patient (t)     Are you currently receiving Home Health services No  -HP No  -HP (r) patient (t)     Do you plan to receive Home Health services in the near future No  -HP No  -HP (r)  patient (t)            Daily Activities    Primary Language English  -HP English  -HP (r) patient (t)     Are you able to read Yes  -HP Yes  -HP (r) patient (t)     Are you able to write Yes  -HP Yes  -HP (r) patient (t)     How does patient learn best? Listening; Reading; Demonstration; Pictures/Video  -HP Listening; Reading; Demonstration; Pictures/Video  -HP (r) patient (t)     Pt Participated in POC and Goals Yes  -HP Yes  -HP            Safety    Are you being hurt, hit, or frightened by anyone at home or in your life? No  -HP No  -HP (r) patient (t)     Are you being neglected by a caregiver No  -HP No  -HP (r) patient (t)     Have you had any of the following issues with N/A  -HP N/A  -HP (r) patient (t)           User Key  (r) = Recorded By, (t) = Taken By, (c) = Cosigned By    Initials Name Provider Type     Kendall Pina, PT Physical Therapist    patient Lyla Castaneda --                 PT Ortho     Row Name 01/13/22 0730       Precautions and Contraindications    Precautions Common extensor repair 10/4/2021  -HP       Subjective Pain    Able to rate subjective pain? yes  -HP       Posture/Observations    Posture/Observations Comments Mild to moderate TTP along the right elbow specifically at the lateral epicondylitis.  Patient does have noted swelling throughout the right upper extremity specifically below the elbow.  Incision has healed/closed at this point.  -HP       Quarter Clearing    Quarter Clearing Upper Quarter Clearing  -HP       Sensory Screen for Light Touch- Upper Quarter Clearing    C4 (posterior shoulder) Intact  -HP    C5 (lateral upper arm) Intact  -HP    C6 (tip of thumb) Intact  -HP    C7 (tip of 3rd finger) Intact  -HP    C8 (tip of 5th finger) Intact  -HP    T1 (medial lower arm) Intact  -HP       General ROM    RT Upper Ext Rt Elbow Extension/Flexion; Rt Elbow Supination; Rt Elbow Pronation; Rt Wrist Flexion; Rt Wrist Extension  -HP       Right Upper Ext    Rt Elbow  Extension/Flexion AROM -21/WNL  -HP    Rt Elbow Supination AROM WNL  -HP    Rt Elbow Pronation AROM WNL  -HP    Rt Wrist Flexion AROM WNL  -HP    Rt Wrist Extension AROM WNL  -HP       MMT (Manual Muscle Testing)    General MMT Comments Deferred  -          User Key  (r) = Recorded By, (t) = Taken By, (c) = Cosigned By    Initials Name Provider Type    HP Kendall Pina, PT Physical Therapist                            Therapy Education  Education Details: HEP included: Elbow extension stretch over towel, elbow extension with overpressure at table, wrist extensor stretch, wrist flexor stretch, putty squeezes.  Given: HEP, Symptoms/condition management, Pain management, Posture/body mechanics  Program: New  How Provided: Verbal, Demonstration, Written  Provided to: Patient  Level of Understanding: Teach back education performed, Verbalized, Demonstrated      PT OP Goals     Row Name 01/13/22 0730          PT Short Term Goals    STG Date to Achieve 02/03/22  -HP     STG 1 Patient to report improvement of symptoms by 50% or greater.  -HP     STG 1 Progress New  -     STG 2 Patient to report adherence to HEP.  -HP     STG 2 Progress New  -HP     STG 3 Patient to report decrease in QuickDASH score to less than or equal to 50.  -HP     STG 3 Progress New  -     STG 4 Patient to demonstrate full active range of motion of the right elbow without pain/limitations.  -HP     STG 4 Progress New  -            Long Term Goals    LTG Date to Achieve 02/24/22  -HP     LTG 1 Patient to report improvement of symptoms by 75% or greater.  -HP     LTG 1 Progress New  -HP     LTG 2 Patient to report independence with HEP.  -HP     LTG 2 Progress New  -HP     LTG 3 Patient to report decrease in QuickDASH score to less than or equal to 30.  -HP     LTG 3 Progress New  -HP     LTG 4 Patient to demonstrate WNL MMT of the right elbow/wrist.  -HP     LTG 4 Progress New  -     LTG 5 Patient to report return to ADLs/work requirements  without limitations or pain in the right elbow  -     LTG 5 Progress New  -HP            Time Calculation    PT Goal Re-Cert Due Date 04/13/22  -           User Key  (r) = Recorded By, (t) = Taken By, (c) = Cosigned By    Initials Name Provider Type    HP Kendall Pina, PT Physical Therapist                 PT Assessment/Plan     Row Name 01/13/22 0730          PT Assessment    Functional Limitations Limitation in home management; Performance in leisure activities; Performance in self-care ADL  -HP     Impairments Pain; Muscle strength; Range of motion; Joint mobility  -HP     Assessment Comments Patient is a 46-year-old female that presents with a low complexity and evolving case of right elbow pain.  Patient had a common extensor debridement on 10/4/2021 where she was seen following for 1 initial evaluation of physical therapy.  However due to the patient's insurance she was unable to attend further sessions and was discharged following.  Today she presents with follow-up in order to seek improvements of her right elbow.  Upon evaluation, the patient's biggest limitation is in active range of motion of the right elbow as she is unable to obtain full extension.  Patient does have marked tenderness and swelling in the right upper extremity specifically in the forearm.  Overall, she is doing well to this point but would benefit greatly from skilled therapy in order to further improve areas of weakness and limitations.  -HP     Please refer to paper survey for additional self-reported information Yes  -HP     Rehab Potential Good  -HP     Patient/caregiver participated in establishment of treatment plan and goals Yes  -HP     Patient would benefit from skilled therapy intervention Yes  -HP            PT Plan    PT Frequency 1x/week; 2x/week  -HP     Predicted Duration of Therapy Intervention (PT) 8-10 visits  -HP     Planned CPT's? PT EVAL LOW COMPLEXITY: 53223; PT THER PROC EA 15 MIN: 85006; PT THER ACT EA 15  MIN: 82728; PT MANUAL THERAPY EA 15 MIN: 30881; PT NEUROMUSC RE-EDUCATION EA 15 MIN: 50747  -     Physical Therapy Interventions (Optional Details) fine motor skills; gross motor skills; home exercise program; joint mobilization; manual therapy techniques; modalities; neuromuscular re-education; postural re-education; ROM (Range of Motion); patient/family education; strengthening; stretching  -     PT Plan Comments Patient to benefit from PT services with a focus on improving active range of motion of the right elbow, improving strength, improving functional mobility, decreasing pain, and returning to prior level of function.  -           User Key  (r) = Recorded By, (t) = Taken By, (c) = Cosigned By    Initials Name Provider Type    Kendall Uribe, PT Physical Therapist                   OP Exercises     Row Name 01/13/22 7479             Subjective Pain    Able to rate subjective pain? yes  -            User Key  (r) = Recorded By, (t) = Taken By, (c) = Cosigned By    Initials Name Provider Type    Kendall Uribe PT Physical Therapist                              Outcome Measure Options: Quick DASH  Quick DASH  Open a tight or new jar.: Unable  Do heavy household chores (e.g., wash walls, wash floors): Severe Difficulty  Carry a shopping bag or briefcase: Severe Difficulty  Wash your back: Mild Difficulty  Use a knife to cut food: Mild Difficulty  Recreational activities in which you take some force or impact through your arm, should or hand (e.g. golf, hammering, tennis, etc.): Unable  During the past week, to what extent has your arm, shoulder, or hand problem interfered with your normal social activites with family, friends, neighbors or groups?: Moderately  During the past week, were you limited in your work or other regular daily activities as a result of your arm, shoulder or hand problem?: Moderately Limited  Arm, Shoulder, or hand pain: Severe  Tingling (pins and needles) in your arm,  shoulder, or hand: Moderate  During the past week, how much difficulty have you had sleeping because of the pain in your arm, shoulder or hand?: Moderate Difficiculty  Number of Questions Answered: 11  Quick DASH Score: 61.36         Time Calculation:     Start Time: 0730  Untimed Charges  PT Eval/Re-eval Minutes: 60  Total Minutes  Untimed Charges Total Minutes: 60   Total Minutes: 60     Therapy Charges for Today     Code Description Service Date Service Provider Modifiers Qty    15421200974 HC PT EVAL LOW COMPLEXITY 4 1/13/2022 Kendall Pina, PT GP 1          PT G-Codes  Outcome Measure Options: Quick DASH  Quick DASH Score: 61.36         Kendall Pina, PT  1/13/2022

## 2022-01-25 ENCOUNTER — HOSPITAL ENCOUNTER (OUTPATIENT)
Dept: PHYSICAL THERAPY | Facility: HOSPITAL | Age: 47
Setting detail: THERAPIES SERIES
Discharge: HOME | End: 2022-01-25

## 2022-01-25 DIAGNOSIS — S56.519A PARTIAL TEAR OF COMMON EXTENSOR TENDON OF ELBOW: Primary | ICD-10-CM

## 2022-01-25 DIAGNOSIS — M77.11 LATERAL EPICONDYLITIS OF RIGHT ELBOW: ICD-10-CM

## 2022-01-25 PROCEDURE — 97140 MANUAL THERAPY 1/> REGIONS: CPT | Performed by: GENERAL ACUTE CARE HOSPITAL

## 2022-01-25 PROCEDURE — 97110 THERAPEUTIC EXERCISES: CPT | Performed by: GENERAL ACUTE CARE HOSPITAL

## 2022-01-25 NOTE — THERAPY TREATMENT NOTE
Outpatient Physical Therapy Ortho Treatment Note  [facility]     Patient Name: Lyla Castaneda  : 1975  MRN: [id number]  Today's Date: 2022      Visit Date: 2022    Visit Dx:    ICD-10-CM ICD-9-CM   1. Partial tear of common extensor tendon of elbow  S56.519A 841.8   2. Lateral epicondylitis of right elbow  M77.11 726.32       Patient Active Problem List   Diagnosis   • Cervical radiculopathy   • Bursitis of right elbow   • Lateral epicondylitis of right elbow   • Partial tear of common extensor tendon of elbow        Past Medical History:   Diagnosis Date   • Arthritis of neck    • Knee swelling    • Lateral epicondylitis of right elbow         Past Surgical History:   Procedure Laterality Date   • ELBOW PROCEDURE Right 10/04/2021    Right lateral epicondyle debridement, and common extensor tendon origin repair 10/4/21) Dr Torsten Cody, [facility]   • GALLBLADDER SURGERY     • TUBAL ABDOMINAL LIGATION                          PT Assessment/Plan     Row Name 22 2950          PT Assessment    Assessment Comments Pt did well with therapy with mild complaints of soreness throughout the session with elbow extension. Due to pt's copay with each visit, pt to be seen every other week at this time.  -HP            PT Plan    PT Plan Comments Continue per POC  -HP           User Key  (r) = Recorded By, (t) = Taken By, (c) = Cosigned By    Initials Name Provider Type    Kendall Uribe, PT Physical Therapist                   OP Exercises     Row Name 22 7404             Subjective Comments    Subjective Comments Pt stated that she was having some increased soreness over the last few days  -HP              Subjective Pain    Able to rate subjective pain? yes  -HP      Pre-Treatment Pain Level 2  -HP      Post-Treatment Pain Level 1  -HP              Total Minutes    49290 - PT Therapeutic Exercise Minutes 30  -HP      93451 - PT Manual Therapy Minutes 10  -HP              Exercise 1     Exercise Name 1 UBE  -HP      Time 1 2 min fwd and 2 min bwd  -HP      Additional Comments L5  -HP              Exercise 2    Exercise Name 2  squeezes with red and green  -HP      Reps 2 10 each  -HP              Exercise 3    Exercise Name 3 Red therabar U and N  -HP      Sets 3 2  -HP      Reps 3 10  -HP              Exercise 4    Exercise Name 4 Red therabar wrist ext and wrist flexion  -HP      Sets 4 2  -HP      Reps 4 10  -HP              Exercise 5    Exercise Name 5 4# DB elbow flexion, reverse curl, hammer curl  -HP      Sets 5 2  -HP      Reps 5 10 each  -HP              Exercise 6    Exercise Name 6 Standing elbow ext into table  -HP      Sets 6 2  -HP      Reps 6 10  -HP              Exercise 7    Exercise Name 7 Standing wall push up  -HP      Sets 7 2  -HP      Reps 7 10  -HP              Exercise 8    Exercise Name 8 Standing 5# DB wrist curls and wrist ext  -HP      Sets 8 2  -HP      Reps 8 10  -HP            User Key  (r) = Recorded By, (t) = Taken By, (c) = Cosigned By    Initials Name Provider Type     Kendall Pina PT Physical Therapist                         Manual Rx (last 36 hours)     Manual Treatments     Row Name 01/25/22 0730             Total Minutes    96133 - PT Manual Therapy Minutes 10  -HP              Manual Rx 1    Manual Rx 1 Location R elbow  -HP      Manual Rx 1 Type Soft tissue mobilization with the use of tools with light to moderate pressure along the common wrist extensors  -HP      Manual Rx 1 Duration 10  -HP            User Key  (r) = Recorded By, (t) = Taken By, (c) = Cosigned By    Initials Name Provider Type     Kendall Pina PT Physical Therapist                                   Time Calculation:   Start Time: 0730  Timed Charges  86002 - PT Therapeutic Exercise Minutes: 30  94407 - PT Manual Therapy Minutes: 10  Total Minutes  Timed Charges Total Minutes: 40   Total Minutes: 40  Therapy Charges for Today     Code Description Service Date Service  Provider Modifiers Qty    28660401292  PT THER PROC EA 15 MIN 1/25/2022 Kendall Pina, PT GP 2    [id number]  PT MANUAL THERAPY EA 15 MIN 1/25/2022 Kendall Pina, PT GP 1                    Kendall Pina, PT  1/25/2022

## 2022-02-07 ENCOUNTER — TELEPHONE (OUTPATIENT)
Dept: ORTHOPEDIC SURGERY | Facility: CLINIC | Age: 47
End: 2022-02-07

## 2022-02-07 NOTE — TELEPHONE ENCOUNTER
Patient states the elbow feels more swollen and irritated.  I made her an appointment to have elbow checked out tomorrow with Manuela when Dr. Cody is in the office.

## 2022-02-07 NOTE — TELEPHONE ENCOUNTER
Patient's right elbow is really swollen and warm to touch patient would like to know what she needs to do. Very hard to continue with PT. Incision is red.

## 2022-02-08 ENCOUNTER — HOSPITAL ENCOUNTER (OUTPATIENT)
Dept: PHYSICAL THERAPY | Facility: HOSPITAL | Age: 47
Setting detail: THERAPIES SERIES
Discharge: HOME | End: 2022-02-08

## 2022-02-08 ENCOUNTER — OFFICE VISIT (OUTPATIENT)
Dept: ORTHOPEDIC SURGERY | Facility: CLINIC | Age: 47
End: 2022-02-08

## 2022-02-08 VITALS
WEIGHT: 236 LBS | HEIGHT: 67 IN | BODY MASS INDEX: 37.04 KG/M2 | SYSTOLIC BLOOD PRESSURE: 127 MMHG | DIASTOLIC BLOOD PRESSURE: 92 MMHG

## 2022-02-08 DIAGNOSIS — S56.519A PARTIAL TEAR OF COMMON EXTENSOR TENDON OF ELBOW: Primary | ICD-10-CM

## 2022-02-08 DIAGNOSIS — M77.11 LATERAL EPICONDYLITIS OF RIGHT ELBOW: ICD-10-CM

## 2022-02-08 DIAGNOSIS — M77.11 LATERAL EPICONDYLITIS OF RIGHT ELBOW: Primary | ICD-10-CM

## 2022-02-08 DIAGNOSIS — S56.519A PARTIAL TEAR OF COMMON EXTENSOR TENDON OF ELBOW: ICD-10-CM

## 2022-02-08 PROCEDURE — 99213 OFFICE O/P EST LOW 20 MIN: CPT | Performed by: PHYSICIAN ASSISTANT

## 2022-02-08 PROCEDURE — 97140 MANUAL THERAPY 1/> REGIONS: CPT | Performed by: GENERAL ACUTE CARE HOSPITAL

## 2022-02-08 PROCEDURE — 97110 THERAPEUTIC EXERCISES: CPT | Performed by: GENERAL ACUTE CARE HOSPITAL

## 2022-02-08 NOTE — THERAPY TREATMENT NOTE
Outpatient Physical Therapy Ortho Treatment Note  [facility]     Patient Name: Lyla Castaneda  : 1975  MRN: [id number]  Today's Date: 2022      Visit Date: 2022    Visit Dx:    ICD-10-CM ICD-9-CM   1. Partial tear of common extensor tendon of elbow  S56.519A 841.8   2. Lateral epicondylitis of right elbow  M77.11 726.32       Patient Active Problem List   Diagnosis   • Cervical radiculopathy   • Bursitis of right elbow   • Lateral epicondylitis of right elbow   • Partial tear of common extensor tendon of elbow        Past Medical History:   Diagnosis Date   • Arthritis of neck    • Knee swelling    • Lateral epicondylitis of right elbow         Past Surgical History:   Procedure Laterality Date   • ELBOW PROCEDURE Right 10/04/2021    Right lateral epicondyle debridement, and common extensor tendon origin repair 10/4/21) Dr Torsten Cody, [facility]   • GALLBLADDER SURGERY     • TUBAL ABDOMINAL LIGATION                          PT Assessment/Plan     Row Name 22 3354          PT Assessment    Assessment Comments Pt arrived today with increased R elbow pain. She also presented with noticeable R elbow swelling and into the R hand. Today's focus was made on soft tissue moblization of the R elbow region. Pt was also provided a compression sleeve to help with swelling.  -HP            PT Plan    PT Plan Comments Determine follow up with MD to progress POC as necessary  -           User Key  (r) = Recorded By, (t) = Taken By, (c) = Cosigned By    Initials Name Provider Type     Kendall Pina, PT Physical Therapist                   OP Exercises     Row Name 22 6257             Subjective Comments    Subjective Comments Pt stated that she is having a lot of increased soreness and swelling of the last few days  -              Subjective Pain    Able to rate subjective pain? yes  -HP      Pre-Treatment Pain Level 6  -HP      Post-Treatment Pain Level 5  -HP              Total  Minutes    84589 - PT Therapeutic Exercise Minutes 15  -HP      12301 - PT Manual Therapy Minutes 23  -HP              Exercise 1    Exercise Name 1 Wrist flexion and ext  -HP      Sets 1 2  -HP      Reps 1 10  -HP              Exercise 2    Exercise Name 2 Wrist flexion and extension with 2# DB  -HP      Sets 2 2  -HP      Reps 2 10  -HP              Exercise 3    Exercise Name 3 Elbow supination/pronation  -HP      Sets 3 2  -HP      Reps 3 10  -HP      Additional Comments 2# DB  -HP              Exercise 4    Exercise Name 4 Wrist flexion and extension stretch  -HP      Sets 4 2  -HP      Time 4 30 sec holds  -HP              Exercise 5    Exercise Name 5 Standing elbow extension on table  -HP      Sets 5 2  -HP      Reps 5 10  -HP            User Key  (r) = Recorded By, (t) = Taken By, (c) = Cosigned By    Initials Name Provider Type     Kendall Pina PT Physical Therapist                         Manual Rx (last 36 hours)     Manual Treatments     Row Name 02/08/22 0730             Total Minutes    47225 - PT Manual Therapy Minutes 23  -HP              Manual Rx 1    Manual Rx 1 Location R elbow  -HP      Manual Rx 1 Type Soft tissue mobilization with use of hands along the R common extensor  -HP      Manual Rx 1 Duration 10  -HP              Manual Rx 2    Manual Rx 2 Location R elbow  -HP      Manual Rx 2 Type Ice massage along the R common extensor  -HP      Manual Rx 2 Duration 13  -HP            User Key  (r) = Recorded By, (t) = Taken By, (c) = Cosigned By    Initials Name Provider Type     Kendall Pina PT Physical Therapist                                   Time Calculation:   Start Time: 0730  Timed Charges  44621 - PT Therapeutic Exercise Minutes: 15  75307 - PT Manual Therapy Minutes: 23  Total Minutes  Timed Charges Total Minutes: 38   Total Minutes: 38  Therapy Charges for Today     Code Description Service Date Service Provider Modifiers Qty    75253276250  PT THER PROC EA 15 MIN 2/8/2022  Kendall Pina, PT GP 1    [id number]  PT MANUAL THERAPY EA 15 MIN 2/8/2022 Kendall Pina, PT GP 2                    Kendall Pina, PT  2/8/2022

## 2022-02-08 NOTE — PROGRESS NOTES
"    [facility] Orthopaedic Surgery Clinic Note        Subjective     CC: Follow-up (4.5 week recheck - Right lateral epicondyle debridement, and common extensor tendon origin repair 10/4/21)      KAL Castaneda is a 46 y.o. female.  Patient returns today for follow-up right elbow.  She states during her last appointment on 1/6/2022 with Dr. Cody she did not tell him about a fall she had in December.  She is concerned because she is continued to have pain and swelling especially along the dorsum of the fall and into the lateral aspect of the elbow.  She has been working with physical therapy and had a session this morning.  She is wearing a stockinette to the elbow.    She endorses a pain scale of 5/10.      Overall, patient's symptoms are unchanged.    ROS:    Constiutional:Pt denies fever, chills, nausea, or vomiting.  MSK:as above        Objective      Past Medical History  Past Medical History:   Diagnosis Date   • Arthritis of neck    • Knee swelling    • Lateral epicondylitis of right elbow          Physical Exam  /92   Ht 170.2 cm (67.01\")   Wt 107 kg (236 lb)   BMI 36.95 kg/m²     Body mass index is 36.95 kg/m².    Patient is well nourished and well developed.        Ortho Exam  Right elbow  Skin: Surgical incision sites well-healed without redness or warmth.  She does have some mild swelling noted lateral elbow into the forearm.  All compartments throughout the forearm are soft and compressible.  Tenderness: Positive tenderness noted lateral aspect of the elbow.  Motion: 0-140 degrees.  Motor/sensory: Grossly C5-T1.      Imaging/Labs/EMG Reviewed:  No new imaging today.      Assessment:  1. Lateral epicondylitis of right elbow    2. Partial tear of common extensor tendon of elbow        Plan:  1. Right lateral elbow epicondylitis with partial tear, extensor tendon--patient instructed to continue with PT.  2. Continue using compression stockinette for inflammation/swelling control.  3. Continue with " over-the-counter pain medication as needed.  4. To keep follow-up appointment Dr. Cody on 3/8/2022.  5. Questions and concerns answered.    Patient was also examined by Dr. Cody and he agrees with the above assessment and plan.      Manuela Bahena PA-C  02/10/22  08:25 EST      Dictated Utilizing Dragon Dictation.

## 2022-02-17 ENCOUNTER — APPOINTMENT (OUTPATIENT)
Dept: PHYSICAL THERAPY | Facility: HOSPITAL | Age: 47
End: 2022-02-17

## 2022-04-13 ENCOUNTER — DOCUMENTATION (OUTPATIENT)
Dept: PHYSICAL THERAPY | Facility: HOSPITAL | Age: 47
End: 2022-04-13

## 2022-04-13 DIAGNOSIS — S56.519A PARTIAL TEAR OF COMMON EXTENSOR TENDON OF ELBOW: Primary | ICD-10-CM

## 2022-04-13 DIAGNOSIS — M77.11 LATERAL EPICONDYLITIS OF RIGHT ELBOW: ICD-10-CM

## 2022-04-13 NOTE — THERAPY DISCHARGE NOTE
Outpatient Physical Therapy Discharge Summary         Patient Name: Lyla Castaneda  : 1975  MRN: [id number]    Today's Date: 2022    Visit Dx:    ICD-10-CM ICD-9-CM   1. Partial tear of common extensor tendon of elbow  S56.519A 841.8   2. Lateral epicondylitis of right elbow  M77.11 726.32           OP PT Discharge Summary  Date of Discharge: 22  Discharge Instructions/Additional Comments: Patient was seen for several visits; however, at last visit patient was having increased pain and swelling.  For this reason patient was referred back to her MD to determine plan of care for further therapy.  Patient did not follow-up after being seen by her MD and for this reason patient to be discharged at this time.  Prognosis is fair based off of patient status at her last visit.      Time Calculation:                    Kendall Pina, PT  2022

## 2022-07-29 ENCOUNTER — OFFICE VISIT (OUTPATIENT)
Dept: ORTHOPEDIC SURGERY | Facility: CLINIC | Age: 47
End: 2022-07-29

## 2022-07-29 VITALS
WEIGHT: 233.4 LBS | BODY MASS INDEX: 36.63 KG/M2 | HEIGHT: 67 IN | DIASTOLIC BLOOD PRESSURE: 82 MMHG | SYSTOLIC BLOOD PRESSURE: 124 MMHG

## 2022-07-29 DIAGNOSIS — M25.521 ARTHRALGIA OF RIGHT ELBOW: ICD-10-CM

## 2022-07-29 DIAGNOSIS — S56.519A PARTIAL TEAR OF COMMON EXTENSOR TENDON OF ELBOW: ICD-10-CM

## 2022-07-29 DIAGNOSIS — M77.11 LATERAL EPICONDYLITIS OF RIGHT ELBOW: Primary | ICD-10-CM

## 2022-07-29 DIAGNOSIS — M25.521 RIGHT ELBOW PAIN: ICD-10-CM

## 2022-07-29 PROCEDURE — 99214 OFFICE O/P EST MOD 30 MIN: CPT | Performed by: ORTHOPAEDIC SURGERY

## 2022-07-29 RX ORDER — TRAZODONE HYDROCHLORIDE 150 MG/1
1 TABLET ORAL NIGHTLY
COMMUNITY

## 2022-07-29 NOTE — PROGRESS NOTES
[facility] Orthopaedic Surgery Office Follow Up       Office Follow Up Visit       Patient Name: Lyla Castaneda    Chief Complaint:   Chief Complaint   Patient presents with   • Follow-up     5 month follow up; 9 months s/p Right lateral epicondyle debridement, and common extensor tendon origin repair 10/4/21       Referring Physician: No ref. provider found    History of Present Illness:   It has been 5  month(s) since Lyla Castaneda's last visit. Lyla Castaneda returns to clinic today for F/U: follow-up of rightBody Part: elbowReason: arthroscopy. The issue has been ongoing for 1+ year(s). Lyla Castaneda rates HIS/HER: herpain at 5/10 on the pain scale. Previous/current treatments: bracing and physical therapy. Current symptoms:Symptoms: pain and stiffness. The pain is worse with any movement of the joint; ice improves the pain. Overall, he/she: sheis doing the same.  I have reviewed the patient's history of present illness as noted/entered above.    I have reviewed the patient's past medical history, surgical history, social history, family history, medications, and allergies as noted in the electronic medical record and as noted/entered.  I have reviewed the patient's review of systems as noted/enter and updated as noted in the patient's HPI.    Preoperative Diagnosis:  1.       Right lateral epicondylitis with common extensor tendon origin tear   Postoperative Diagnosis:  1.     SAME as preoperative diagnoses     Procedure:  1.       Right lateral epicondylitis debridement and common extensor tendon origin tear repair lateral elbow tendon debridement, soft tissue and/or bone, open with tendon repair or reattachment CPT code 02650  Surgery 10/4/2021        1/6/2022:  Patient presents a little bit delayed for 2-month follow-up.  She had a severe pain response preoperatively with 10 out of 10 severe pain she does still have some pain but would consider to be still  in the relatively early recovery.  Switched jobs mid-November to a different staffing     It appears that she attended 1 initial appointment with Marco Garcia in physical therapy and then 1 additional appointment and then canceled the subsequent appointments.     I counseled on the need for physical therapy at this point additional recovery time therapy can be incredibly beneficial.     10/19/2021:  Patient has some issues with postoperative pain medications as she carries a history of allergy to hydrocodone but ultimately tolerated Percocet for that.  Seems to be doing well but has been difficult to be bound up following surgery which is understandable  Typical postoperative course  Desires to return to work -- [employer]        Prior: Persistent right elbow pain with common extensor tendon origin tear right lateral epicondylitis worsening over the last 10 months rates the pain persistently is a 10-10 severely limiting basic ADLs     I reviewed her neurology note, her orthopedic note on 2/9/2021.  She is originally diagnosed with bilateral carpal tunnel syndrome possible cervical radiculopathy but she had an EMG/NCV that was reportedly within normal limits and satisfactory cervical spine MRI with some degenerative changes.  Ultimately she had a right elbow MRI which confirmed common extensor tendon origin tear.     The patient desires to discuss right elbow surgery      Surgery right elbow, extensor tendon origin repair 10/4/2021    7/29/2022:  I saw the patient last visit on 1/6/2022 now nearly 7 months prior.  We had recommended a follow-up appointment after that but have not seen the patient back since that time physical therapy was recommended at that time as well.  She did see our PA Manuela Bahena in February 2021 and notes that she did have a fall that we had not discussed back in December a couple months after her surgery.  As noted in prior notes she had a severe  preoperative pain response counseled about possible neurogenic involvement given the significant pain response and we pursued emg/ncv at that time.      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    Discussed that is relatively rare/almost incredibly rare to do a revision surgery on a common extensor tendon origin with poor tendon quality these typically heal but we will get an updated MRI to ensure no acute worsening or acute changes          Subjective   Subjective      Review of Systems   Constitutional: Positive for activity change.   HENT: Negative.    Eyes: Negative.    Respiratory: Negative.    Cardiovascular: Negative.    Gastrointestinal: Negative.    Endocrine: Negative.    Genitourinary: Negative.    Musculoskeletal: Positive for arthralgias.   Skin: Negative.    Allergic/Immunologic: Negative.    Neurological: Negative.    Hematological: Negative.    Psychiatric/Behavioral: Negative.         Past Medical History:   Past Medical History:   Diagnosis Date   • Arthritis of neck    • Knee swelling    • Lateral epicondylitis of right elbow        Past Surgical History:   Past Surgical History:   Procedure Laterality Date   • ELBOW PROCEDURE Right 10/04/2021    Right lateral epicondyle debridement, and common extensor tendon origin repair 10/4/21) Dr Torsten Cody, [facility]   • GALLBLADDER SURGERY     • TUBAL ABDOMINAL LIGATION         Family History:   Family History   Problem Relation Age of Onset   • Hypertension Father    • Diabetes Father  "   • Anesthesia problems Father        Social History:   Social History     Socioeconomic History   • Marital status: Single   Tobacco Use   • Smoking status: Never Smoker   • Smokeless tobacco: Never Used   Vaping Use   • Vaping Use: Never used   Substance and Sexual Activity   • Alcohol use: Not Currently     Comment: 1 maybe every 2 months   • Drug use: Never   • Sexual activity: Not Currently     Partners: Male     Birth control/protection: Condom       Medications:   Current Outpatient Medications:   •  acetaminophen (TYLENOL) 500 MG tablet, Take 500 mg by mouth Every 6 (Six) Hours As Needed for Mild Pain ., Disp: , Rfl:   •  NP Thyroid 90 MG tablet, Take 90 mg by mouth Daily., Disp: , Rfl:   •  sertraline (ZOLOFT) 50 MG tablet, Take 50 mg by mouth Daily., Disp: , Rfl:   •  traZODone (DESYREL) 150 MG tablet, Take 1 tablet by mouth Every Night., Disp: , Rfl:   •  vitamin D3 125 MCG (5000 UT) capsule capsule, Take 5,000 Units by mouth Daily., Disp: , Rfl:     Allergies:   Allergies   Allergen Reactions   • Hydrocodone Nausea Only     Itching     • Morphine Itching       The following portions of the patient's history were reviewed and updated as appropriate: allergies, current medications, past family history, past medical history, past social history, past surgical history and problem list.        Objective    Objective      Vital Signs:   Vitals:    07/29/22 0911   BP: 124/82   Weight: 106 kg (233 lb 6.4 oz)   Height: 170.2 cm (67.01\")       Ortho Exam:  Slight loss of extension compared to the hyperextension the contralateral side but able to achieve essentially full extension to neutral and full flexion.  She has pain on the forearm some pain proximally in the area of the common extensor tendon origin the incision has healed SLT intact distally today.  Elbow pronation supination intact    Results Review:  Imaging Results (Last 24 Hours)     Procedure Component Value Units Date/Time    XR Elbow 2 View Right " [[id number]] Resulted: 07/29/22 1058     Updated: 07/29/22 1058    Narrative:      Imaging: elbow x-rays 2 views - AP and lateral elbow x-ray views    Side: RIGHT ELBOW    Indication for elbow x-ray 2 views: elbow pain    Comparison: Prior comparison views available    Findings: no acute bony finding noted, no obvious soft tissue edema    I personally reviewed the above x-rays and discussed with the patient.            Procedures            Assessment / Plan      Assessment/Plan:   Problem List Items Addressed This Visit        Musculoskeletal and Injuries    Lateral epicondylitis of right elbow - Primary    Relevant Orders    MRI Elbow Right Without Contrast    Partial tear of common extensor tendon of elbow    Relevant Orders    MRI Elbow Right Without Contrast      Other Visit Diagnoses     Arthralgia of right elbow        Relevant Orders    MRI Elbow Right Without Contrast    Right elbow pain        Relevant Orders    XR Elbow 2 View Right (Completed)    MRI Elbow Right Without Contrast        Right elbow MRI is recommended at this time given prior common extensor tendon origin tearing significant pathology in the necessitated surgery.  We will get an MRI to assess the quality of the tendon to see if healing has occurred or if an acute injury has recurred.  We will see the patient back after right elbow MRI.  We are hopeful to continue with conservative course we counseled the patient to that effect as well.  We will continue to follow closely.    Follow Up: After right elbow MRI      Torsten Cody MD, FAAOS  Orthopedic Surgeon  Fellowship Trained Shoulder and Elbow Surgeon  [facility]  Orthopedics and Sports Medicine  [address] [place] [address], [address]  [place] [address]    07/29/22  12:42 EDT   No

## 2022-09-09 ENCOUNTER — APPOINTMENT (OUTPATIENT)
Dept: MRI IMAGING | Facility: HOSPITAL | Age: 47
End: 2022-09-09

## 2022-10-18 DIAGNOSIS — M77.11 LATERAL EPICONDYLITIS OF RIGHT ELBOW: Primary | ICD-10-CM

## 2022-11-10 ENCOUNTER — OFFICE VISIT (OUTPATIENT)
Dept: ORTHOPEDIC SURGERY | Facility: CLINIC | Age: 47
End: 2022-11-10

## 2022-11-10 VITALS
DIASTOLIC BLOOD PRESSURE: 78 MMHG | WEIGHT: 233.8 LBS | BODY MASS INDEX: 36.7 KG/M2 | SYSTOLIC BLOOD PRESSURE: 124 MMHG | HEIGHT: 67 IN

## 2022-11-10 DIAGNOSIS — S46.311A TRICEPS STRAIN, RIGHT, INITIAL ENCOUNTER: Primary | ICD-10-CM

## 2022-11-10 DIAGNOSIS — M25.521 ARTHRALGIA OF RIGHT ELBOW: ICD-10-CM

## 2022-11-10 DIAGNOSIS — M77.11 LATERAL EPICONDYLITIS OF RIGHT ELBOW: ICD-10-CM

## 2022-11-10 DIAGNOSIS — M25.521 RIGHT ELBOW PAIN: ICD-10-CM

## 2022-11-10 DIAGNOSIS — S56.519A PARTIAL TEAR OF COMMON EXTENSOR TENDON OF ELBOW: ICD-10-CM

## 2022-11-10 PROCEDURE — 99213 OFFICE O/P EST LOW 20 MIN: CPT | Performed by: ORTHOPAEDIC SURGERY

## 2022-11-10 RX ORDER — PROGESTERONE 100 MG/1
100 CAPSULE ORAL
COMMUNITY
Start: 2022-09-15

## 2022-11-10 RX ORDER — MELOXICAM 7.5 MG/1
TABLET ORAL
Qty: 30 TABLET | Refills: 1 | Status: SHIPPED | OUTPATIENT
Start: 2022-11-10 | End: 2023-01-11

## 2022-11-10 RX ORDER — METHYLPREDNISOLONE 4 MG/1
TABLET ORAL
Qty: 1 EACH | Refills: 0 | Status: SHIPPED | OUTPATIENT
Start: 2022-11-10

## 2022-11-10 RX ORDER — LORATADINE 10 MG/1
10 TABLET ORAL DAILY
COMMUNITY
Start: 2022-09-22

## 2022-11-10 RX ORDER — ESTRADIOL 1 MG/1
1 TABLET ORAL DAILY
COMMUNITY
Start: 2022-09-15

## 2022-11-10 NOTE — PROGRESS NOTES
[facility] Orthopaedic Surgery Office Follow Up       Office Follow Up Visit       Patient Name: Lyla Castaneda    Chief Complaint:   Chief Complaint   Patient presents with   • Follow-up     MRI Right Elbow performed 10.27.2022       Referring Physician: No ref. provider found    History of Present Illness:   It has been 3  month(s) since Lyla Castaneda's last visit. Lyla Castaneda returns to clinic today for F/U: follow-up of rightBody Part: elbowReason: pain. The issue has been ongoing for 1 year(s). Lyla Castaneda rates HIS/HER: herpain at 6/10 on the pain scale. Previous/current treatments: NSAIDS, physical therapy and oral steroids. Current symptoms:Symptoms: pain and swelling. The pain is worse with any movement of the joint; steroids improves the pain. Overall, he/she: sheis doing the same.  I have reviewed the patient's history of present illness as noted/entered above.    I have reviewed the patient's past medical history, surgical history, social history, family history, medications, and allergies as noted in the electronic medical record and as noted/entered.  I have reviewed the patient's review of systems as noted/enter and updated as noted in the patient's HPI.    Preoperative Diagnosis:  1.       Right lateral epicondylitis with common extensor tendon origin tear   Postoperative Diagnosis:  1.     SAME as preoperative diagnoses     Procedure:  1.       Right lateral epicondylitis debridement and common extensor tendon origin tear repair lateral elbow tendon debridement, soft tissue and/or bone, open with tendon repair or reattachment CPT code 40996  Surgery 10/4/2021        1/6/2022:  Patient presents a little bit delayed for 2-month follow-up.  She had a severe pain response preoperatively with 10 out of 10 severe pain she does still have some pain but would consider to be still in the relatively early recovery.  Switched jobs mid-November to  a different staffing     It appears that she attended 1 initial appointment with Marco Garcia in physical therapy and then 1 additional appointment and then canceled the subsequent appointments.     I counseled on the need for physical therapy at this point additional recovery time therapy can be incredibly beneficial.     10/19/2021:  Patient has some issues with postoperative pain medications as she carries a history of allergy to hydrocodone but ultimately tolerated Percocet for that.  Seems to be doing well but has been difficult to be bound up following surgery which is understandable  Typical postoperative course  Desires to return to work -- [employer]        Prior: Persistent right elbow pain with common extensor tendon origin tear right lateral epicondylitis worsening over the last 10 months rates the pain persistently is a 10-10 severely limiting basic ADLs     I reviewed her neurology note, her orthopedic note on 2/9/2021.  She is originally diagnosed with bilateral carpal tunnel syndrome possible cervical radiculopathy but she had an EMG/NCV that was reportedly within normal limits and satisfactory cervical spine MRI with some degenerative changes.  Ultimately she had a right elbow MRI which confirmed common extensor tendon origin tear.     The patient desires to discuss right elbow surgery        Surgery right elbow, extensor tendon origin repair 10/4/2021     7/29/2022:  I saw the patient last visit on 1/6/2022 now nearly 7 months prior.  We had recommended a follow-up appointment after that but have not seen the patient back since that time physical therapy was recommended at that time as well.  She did see our PA Manuela Bahena in February 2021 and notes that she did have a fall that we had not discussed back in December a couple months after her surgery.  As noted in prior notes she had a severe preoperative pain response counseled about possible neurogenic  involvement given the significant pain response and we pursued emg/ncv at that time.        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     Discussed that is relatively rare/almost incredibly rare to do a revision surgery on a common extensor tendon origin with poor tendon quality these typically heal but we will get an updated MRI to ensure no acute worsening or acute changes      11/10/2022:  I personally reviewed her new MRI and her 8/4/2021 MRI dramatic changes in a positive way following surgery.  She had nearly completely torn the entire attachment site on her preop MRI and fortunately her new MRI on 10/27/2022 from [facility] in [facility] shows satisfactory findings with healing of the common extensor tendon origin.  She has some edema in the medial head deep portion of the triceps counseled on the incidental findings of this.      Subjective   Subjective      Review of Systems   Constitutional: Negative.  Negative for chills, fatigue and fever.   HENT: Negative.  Negative for congestion and dental problem.    Eyes: Negative.  Negative for blurred vision.   Respiratory: Negative.  Negative for shortness of breath.    Cardiovascular: Negative.  Negative for leg swelling.   Gastrointestinal: Negative.  Negative for abdominal pain.   Endocrine: Negative.  Negative for polyuria.   Genitourinary: Negative.  Negative for difficulty urinating.   Musculoskeletal: Positive for arthralgias.   Skin: Negative.    Allergic/Immunologic: Negative.     Neurological: Negative.    Hematological: Negative.  Negative for adenopathy.   Psychiatric/Behavioral: Negative.  Negative for behavioral problems.        Past Medical History:   Past Medical History:   Diagnosis Date   • Arthritis of neck    • Knee swelling    • Lateral epicondylitis of right elbow        Past Surgical History:   Past Surgical History:   Procedure Laterality Date   • ELBOW PROCEDURE Right 10/04/2021    Right lateral epicondyle debridement, and common extensor tendon origin repair 10/4/21) Dr Torsten Cody, [facility]   • GALLBLADDER SURGERY     • TUBAL ABDOMINAL LIGATION         Family History:   Family History   Problem Relation Age of Onset   • Hypertension Father    • Diabetes Father    • Anesthesia problems Father        Social History:   Social History     Socioeconomic History   • Marital status: Single   Tobacco Use   • Smoking status: Never   • Smokeless tobacco: Never   Vaping Use   • Vaping Use: Never used   Substance and Sexual Activity   • Alcohol use: Not Currently     Comment: 1 maybe every 2 months   • Drug use: Never   • Sexual activity: Not Currently     Partners: Male     Birth control/protection: Condom       Medications:   Current Outpatient Medications:   •  acetaminophen (TYLENOL) 500 MG tablet, Take 500 mg by mouth Every 6 (Six) Hours As Needed for Mild Pain ., Disp: , Rfl:   •  estradiol (ESTRACE) 1 MG tablet, Take 1 tablet by mouth Daily. as directed, Disp: , Rfl:   •  loratadine (CLARITIN) 10 MG tablet, Take 1 tablet by mouth Daily., Disp: , Rfl:   •  NP Thyroid 90 MG tablet, Take 90 mg by mouth Daily., Disp: , Rfl:   •  Progesterone (PROMETRIUM) 100 MG capsule, Take 1 capsule by mouth every night at bedtime., Disp: , Rfl:   •  sertraline (ZOLOFT) 50 MG tablet, Take 50 mg by mouth Daily., Disp: , Rfl:   •  traZODone (DESYREL) 150 MG tablet, Take 1 tablet by mouth Every Night., Disp: , Rfl:   •  vitamin D3 125 MCG (5000 UT) capsule capsule, Take 5,000 Units by mouth  "Daily., Disp: , Rfl:   •  meloxicam (MOBIC) 7.5 MG tablet, 1 Oral Daily with food., Disp: 30 tablet, Rfl: 1  •  methylPREDNISolone (MEDROL) 4 MG dose pack, Use as directed by package instructions, Disp: 1 each, Rfl: 0    Allergies:   Allergies   Allergen Reactions   • Hydrocodone Nausea Only     Itching     • Morphine Itching       The following portions of the patient's history were reviewed and updated as appropriate: allergies, current medications, past family history, past medical history, past social history, past surgical history and problem list.        Objective    Objective      Vital Signs:   Vitals:    11/10/22 0848   BP: 124/78   Weight: 106 kg (233 lb 12.8 oz)   Height: 170.2 cm (67.01\")       Ortho Exam:  Right elbow does have some forearm pain mild triceps findings incisional site is excellent appearing excellent strength with wrist extension    Results Review:  Imaging Results (Last 24 Hours)     ** No results found for the last 24 hours. **        As noted in HPI Pro[facility] imaging 10/27/2022 right elbow without contrast postsurgical changes but fairly satisfactory healing compared to preoperative MRI used for comparison.  Some edema in the distal portion, triceps    Procedures          Assessment / Plan      Assessment/Plan:   Problem List Items Addressed This Visit        Musculoskeletal and Injuries    Lateral epicondylitis of right elbow    Relevant Medications    methylPREDNISolone (MEDROL) 4 MG dose pack    meloxicam (MOBIC) 7.5 MG tablet    Other Relevant Orders    Ambulatory Referral to Physical Therapy Evaluate and treat, Ortho    Partial tear of common extensor tendon of elbow    Relevant Medications    methylPREDNISolone (MEDROL) 4 MG dose pack    meloxicam (MOBIC) 7.5 MG tablet    Other Relevant Orders    Ambulatory Referral to Physical Therapy Evaluate and treat, Ortho    Right elbow pain    Relevant Medications    methylPREDNISolone (MEDROL) 4 MG dose pack    meloxicam (MOBIC) 7.5 MG " tablet    Other Relevant Orders    Ambulatory Referral to Physical Therapy Evaluate and treat, Ortho    Arthralgia of right elbow    Relevant Medications    methylPREDNISolone (MEDROL) 4 MG dose pack    meloxicam (MOBIC) 7.5 MG tablet    Other Relevant Orders    Ambulatory Referral to Physical Therapy Evaluate and treat, Ortho       Other    Triceps strain, right, initial encounter - Primary    Relevant Medications    methylPREDNISolone (MEDROL) 4 MG dose pack    meloxicam (MOBIC) 7.5 MG tablet    Other Relevant Orders    Ambulatory Referral to Physical Therapy Evaluate and treat, Ortho       Right elbow counseled on satisfactory findings on the MRI counseled on treatment options, steroid pack did help her with her foot and ankle issues, Medrol Dosepak provided along with meloxicam with counseling.  Physical therapy prescribed as well.    Follow Up: She will keep me updated pending progress over the next 2 to 3 months      Torsten Cody MD, FAAOS  Orthopedic Surgeon  Fellowship Trained Shoulder and Elbow Surgeon  [facility]  Orthopedics and Sports Medicine  [address] [place] [address], [address]  [place] [address]    11/10/22  09:17 EST

## 2022-11-21 ENCOUNTER — TELEPHONE (OUTPATIENT)
Dept: ORTHOPEDIC SURGERY | Facility: CLINIC | Age: 47
End: 2022-11-21

## 2022-11-21 NOTE — TELEPHONE ENCOUNTER
----- Message from Lyla Castaneda sent at 11/20/2022  4:58 PM EST -----  Regarding: Shot for elbow   Contact: [phone]  The steroids help my elbow but when the medicine wears off it he pain is back. Is there anyway that I can get a cortisone or steroid shot? 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  Thanks,    Lyla

## 2022-11-21 NOTE — TELEPHONE ENCOUNTER
From Karuna:    I would advise meloxicam over steroid injection at this point since she has already been operated on for lateral epi. Appears she had edema on MRI as well at triceps. If this does not improve symptoms she can come into discuss other options with Dr. Cody.          Sent [employer] message to patient.    Bree Javed

## 2023-01-11 DIAGNOSIS — M77.11 LATERAL EPICONDYLITIS OF RIGHT ELBOW: ICD-10-CM

## 2023-01-11 DIAGNOSIS — S56.519A PARTIAL TEAR OF COMMON EXTENSOR TENDON OF ELBOW: ICD-10-CM

## 2023-01-11 DIAGNOSIS — M25.521 RIGHT ELBOW PAIN: ICD-10-CM

## 2023-01-11 DIAGNOSIS — M25.521 ARTHRALGIA OF RIGHT ELBOW: ICD-10-CM

## 2023-01-11 DIAGNOSIS — S46.311A TRICEPS STRAIN, RIGHT, INITIAL ENCOUNTER: ICD-10-CM

## 2023-01-11 RX ORDER — MELOXICAM 7.5 MG/1
TABLET ORAL
Qty: 30 TABLET | Refills: 1 | Status: SHIPPED | OUTPATIENT
Start: 2023-01-11

## 2023-03-22 ENCOUNTER — TELEPHONE (OUTPATIENT)
Dept: ORTHOPEDIC SURGERY | Facility: CLINIC | Age: 48
End: 2023-03-22
Payer: COMMERCIAL

## 2023-03-22 NOTE — TELEPHONE ENCOUNTER
Sent Karuna's message to patient via [employer].    Bree SAAVEDRA (R) ROT      PT is again an option as they can provide dry needling, stem, ultrasound, ect. We would avoid steroid injection at surgical site if at all possible.   Karuna        ----- Message from Lyla Castaneda sent at 3/21/2023  5:17 PM EDT -----  Regarding: Still have elbow pain  Contact: [phone]  I have been taking the meloxicam for the past 4 months and it hasn’t helped my elbow. Is there anything else we can do? Is a steroid or cortisone shot a possibility or so you have any other suggestions. I am sorry I am just tired of this pain, I’ve been dealing with with it for several a years and I still have pain after surgery.     Thank you,    Lyla Castaneda